# Patient Record
Sex: FEMALE | Race: WHITE | NOT HISPANIC OR LATINO | Employment: OTHER | URBAN - METROPOLITAN AREA
[De-identification: names, ages, dates, MRNs, and addresses within clinical notes are randomized per-mention and may not be internally consistent; named-entity substitution may affect disease eponyms.]

---

## 2017-07-11 RX ORDER — LOSARTAN POTASSIUM 50 MG/1
50 TABLET ORAL 2 TIMES DAILY
COMMUNITY
End: 2017-09-28

## 2017-07-11 RX ORDER — OXYBUTYNIN CHLORIDE 5 MG/1
5 TABLET, EXTENDED RELEASE ORAL DAILY
COMMUNITY
End: 2017-09-28

## 2017-07-11 RX ORDER — GABAPENTIN 100 MG/1
100 CAPSULE ORAL 3 TIMES DAILY
COMMUNITY

## 2017-07-11 RX ORDER — LANOLIN ALCOHOL/MO/W.PET/CERES
3 CREAM (GRAM) TOPICAL
COMMUNITY
End: 2017-09-28

## 2017-07-11 RX ORDER — BISACODYL 10 MG
10 SUPPOSITORY, RECTAL RECTAL DAILY PRN
COMMUNITY

## 2017-07-11 RX ORDER — CHOLECALCIFEROL (VITAMIN D3) 125 MCG
2000 CAPSULE ORAL DAILY
COMMUNITY

## 2017-07-11 RX ORDER — ASCORBIC ACID 250 MG
250 TABLET ORAL DAILY
COMMUNITY
End: 2017-09-28

## 2017-07-11 RX ORDER — CLOPIDOGREL BISULFATE 75 MG/1
75 TABLET ORAL DAILY
COMMUNITY
End: 2017-09-28

## 2017-07-11 RX ORDER — AMLODIPINE BESYLATE 5 MG/1
5 TABLET ORAL DAILY
COMMUNITY
End: 2017-09-28

## 2017-07-11 RX ORDER — SERTRALINE HYDROCHLORIDE 100 MG/1
100 TABLET, FILM COATED ORAL DAILY
COMMUNITY

## 2017-07-11 RX ORDER — ECHINACEA PURPUREA EXTRACT 125 MG
1 TABLET ORAL AS NEEDED
COMMUNITY

## 2017-07-11 RX ORDER — MULTIVITAMIN
1 TABLET ORAL DAILY
COMMUNITY

## 2017-07-11 RX ORDER — ACETAMINOPHEN 325 MG/1
650 TABLET ORAL EVERY 4 HOURS PRN
COMMUNITY

## 2017-07-11 RX ORDER — LEVOTHYROXINE SODIUM 0.03 MG/1
25 TABLET ORAL DAILY
COMMUNITY

## 2017-07-11 RX ORDER — ALBUTEROL SULFATE 2.5 MG/3ML
2.5 SOLUTION RESPIRATORY (INHALATION) EVERY 4 HOURS PRN
COMMUNITY
End: 2017-09-28

## 2017-07-11 RX ORDER — AMOXICILLIN 250 MG
1 CAPSULE ORAL DAILY
COMMUNITY
End: 2017-09-28

## 2017-07-11 RX ORDER — FEXOFENADINE HYDROCHLORIDE 60 MG/1
60 TABLET, FILM COATED ORAL 2 TIMES DAILY
COMMUNITY

## 2017-07-13 ENCOUNTER — ANESTHESIA EVENT (OUTPATIENT)
Dept: PERIOP | Facility: HOSPITAL | Age: 77
End: 2017-07-13
Payer: COMMERCIAL

## 2017-07-14 ENCOUNTER — ANESTHESIA (OUTPATIENT)
Dept: PERIOP | Facility: HOSPITAL | Age: 77
End: 2017-07-14
Payer: COMMERCIAL

## 2017-07-14 ENCOUNTER — HOSPITAL ENCOUNTER (OUTPATIENT)
Facility: HOSPITAL | Age: 77
Setting detail: OBSERVATION
Discharge: NURSING, REHAB OR LONG-TERM CARE | End: 2017-07-15
Attending: UROLOGY | Admitting: UROLOGY
Payer: COMMERCIAL

## 2017-07-14 ENCOUNTER — HOSPITAL ENCOUNTER (OUTPATIENT)
Dept: RADIOLOGY | Facility: HOSPITAL | Age: 77
Setting detail: OUTPATIENT SURGERY
Discharge: HOME | End: 2017-07-14
Payer: COMMERCIAL

## 2017-07-14 DIAGNOSIS — N20.0 CALCULUS OF KIDNEY: ICD-10-CM

## 2017-07-14 DIAGNOSIS — N20.0 URIC ACID NEPHROLITHIASIS: ICD-10-CM

## 2017-07-14 PROCEDURE — 74450 X-RAY URETHRA/BLADDER: CPT

## 2017-07-14 PROCEDURE — C1758 CATHETER, URETERAL: HCPCS | Performed by: UROLOGY

## 2017-07-14 PROCEDURE — C2625 STENT, NON-COR, TEM W/DEL SY: HCPCS | Performed by: UROLOGY

## 2017-07-14 PROCEDURE — 87077 CULTURE AEROBIC IDENTIFY: CPT | Performed by: UROLOGY

## 2017-07-14 PROCEDURE — C1769 GUIDE WIRE: HCPCS | Performed by: UROLOGY

## 2017-07-14 PROCEDURE — 87186 SC STD MICRODIL/AGAR DIL: CPT | Performed by: UROLOGY

## 2017-07-14 PROCEDURE — 87086 URINE CULTURE/COLONY COUNT: CPT | Performed by: UROLOGY

## 2017-07-14 DEVICE — STENT KWART RETRO INJECT SET 7FR 145CM: Type: IMPLANTABLE DEVICE | Site: URETER | Status: FUNCTIONAL

## 2017-07-14 RX ORDER — GABAPENTIN 100 MG/1
100 CAPSULE ORAL 3 TIMES DAILY
Status: DISCONTINUED | OUTPATIENT
Start: 2017-07-14 | End: 2017-07-15 | Stop reason: HOSPADM

## 2017-07-14 RX ORDER — LOSARTAN POTASSIUM 50 MG/1
50 TABLET ORAL 2 TIMES DAILY
Status: DISCONTINUED | OUTPATIENT
Start: 2017-07-15 | End: 2017-07-15 | Stop reason: HOSPADM

## 2017-07-14 RX ORDER — MULTIVIT WITH MINERALS/LUTEIN
250 TABLET ORAL DAILY
Status: DISCONTINUED | OUTPATIENT
Start: 2017-07-15 | End: 2017-07-15 | Stop reason: HOSPADM

## 2017-07-14 RX ORDER — PROPOFOL 10 MG/ML
INJECTION, EMULSION INTRAVENOUS CONTINUOUS PRN
Status: DISCONTINUED | OUTPATIENT
Start: 2017-07-14 | End: 2017-07-14 | Stop reason: SURG

## 2017-07-14 RX ORDER — LEVOTHYROXINE SODIUM 0.03 MG/1
25 TABLET ORAL
Status: DISCONTINUED | OUTPATIENT
Start: 2017-07-15 | End: 2017-07-15 | Stop reason: HOSPADM

## 2017-07-14 RX ORDER — BISACODYL 10 MG
10 SUPPOSITORY, RECTAL RECTAL DAILY PRN
Status: DISCONTINUED | OUTPATIENT
Start: 2017-07-14 | End: 2017-07-15 | Stop reason: HOSPADM

## 2017-07-14 RX ORDER — LANOLIN ALCOHOL/MO/W.PET/CERES
3 CREAM (GRAM) TOPICAL
Status: DISCONTINUED | OUTPATIENT
Start: 2017-07-14 | End: 2017-07-15 | Stop reason: HOSPADM

## 2017-07-14 RX ORDER — FENTANYL CITRATE/PF 50 MCG/ML
50 SYRINGE (ML) INJECTION
Status: DISCONTINUED | OUTPATIENT
Start: 2017-07-14 | End: 2017-07-14 | Stop reason: HOSPADM

## 2017-07-14 RX ORDER — MAGNESIUM HYDROXIDE 1200 MG/15ML
LIQUID ORAL AS NEEDED
Status: DISCONTINUED | OUTPATIENT
Start: 2017-07-14 | End: 2017-07-14 | Stop reason: HOSPADM

## 2017-07-14 RX ORDER — SODIUM CHLORIDE, SODIUM LACTATE, POTASSIUM CHLORIDE, CALCIUM CHLORIDE 600; 310; 30; 20 MG/100ML; MG/100ML; MG/100ML; MG/100ML
125 INJECTION, SOLUTION INTRAVENOUS CONTINUOUS
Status: DISCONTINUED | OUTPATIENT
Start: 2017-07-14 | End: 2017-07-15 | Stop reason: HOSPADM

## 2017-07-14 RX ORDER — ALBUTEROL SULFATE 2.5 MG/3ML
2.5 SOLUTION RESPIRATORY (INHALATION) ONCE AS NEEDED
Status: DISCONTINUED | OUTPATIENT
Start: 2017-07-14 | End: 2017-07-14 | Stop reason: HOSPADM

## 2017-07-14 RX ORDER — SODIUM CHLORIDE 9 MG/ML
125 INJECTION, SOLUTION INTRAVENOUS CONTINUOUS
Status: DISCONTINUED | OUTPATIENT
Start: 2017-07-14 | End: 2017-07-15 | Stop reason: HOSPADM

## 2017-07-14 RX ORDER — PROPOFOL 10 MG/ML
INJECTION, EMULSION INTRAVENOUS AS NEEDED
Status: DISCONTINUED | OUTPATIENT
Start: 2017-07-14 | End: 2017-07-14 | Stop reason: SURG

## 2017-07-14 RX ORDER — ONDANSETRON 2 MG/ML
INJECTION INTRAMUSCULAR; INTRAVENOUS AS NEEDED
Status: DISCONTINUED | OUTPATIENT
Start: 2017-07-14 | End: 2017-07-14 | Stop reason: SURG

## 2017-07-14 RX ORDER — EPHEDRINE SULFATE 50 MG/ML
INJECTION, SOLUTION INTRAVENOUS AS NEEDED
Status: DISCONTINUED | OUTPATIENT
Start: 2017-07-14 | End: 2017-07-14

## 2017-07-14 RX ORDER — SERTRALINE HYDROCHLORIDE 100 MG/1
100 TABLET, FILM COATED ORAL DAILY
Status: DISCONTINUED | OUTPATIENT
Start: 2017-07-15 | End: 2017-07-15 | Stop reason: HOSPADM

## 2017-07-14 RX ORDER — OXYBUTYNIN CHLORIDE 5 MG/1
5 TABLET, EXTENDED RELEASE ORAL DAILY
Status: DISCONTINUED | OUTPATIENT
Start: 2017-07-15 | End: 2017-07-15 | Stop reason: HOSPADM

## 2017-07-14 RX ORDER — AMOXICILLIN 250 MG
1 CAPSULE ORAL DAILY
Status: DISCONTINUED | OUTPATIENT
Start: 2017-07-15 | End: 2017-07-15 | Stop reason: HOSPADM

## 2017-07-14 RX ORDER — MEPERIDINE HYDROCHLORIDE 50 MG/ML
12.5 INJECTION INTRAMUSCULAR; INTRAVENOUS; SUBCUTANEOUS ONCE
Status: DISCONTINUED | OUTPATIENT
Start: 2017-07-14 | End: 2017-07-14 | Stop reason: HOSPADM

## 2017-07-14 RX ORDER — ONDANSETRON 2 MG/ML
4 INJECTION INTRAMUSCULAR; INTRAVENOUS ONCE AS NEEDED
Status: DISCONTINUED | OUTPATIENT
Start: 2017-07-14 | End: 2017-07-14 | Stop reason: HOSPADM

## 2017-07-14 RX ORDER — MELATONIN
2000 DAILY
Status: DISCONTINUED | OUTPATIENT
Start: 2017-07-15 | End: 2017-07-15 | Stop reason: HOSPADM

## 2017-07-14 RX ORDER — FENTANYL CITRATE 50 UG/ML
INJECTION, SOLUTION INTRAMUSCULAR; INTRAVENOUS AS NEEDED
Status: DISCONTINUED | OUTPATIENT
Start: 2017-07-14 | End: 2017-07-14 | Stop reason: SURG

## 2017-07-14 RX ORDER — ACETAMINOPHEN 325 MG/1
650 TABLET ORAL EVERY 6 HOURS PRN
Status: DISCONTINUED | OUTPATIENT
Start: 2017-07-14 | End: 2017-07-15 | Stop reason: HOSPADM

## 2017-07-14 RX ORDER — AMLODIPINE BESYLATE 5 MG/1
5 TABLET ORAL DAILY
Status: DISCONTINUED | OUTPATIENT
Start: 2017-07-15 | End: 2017-07-15 | Stop reason: HOSPADM

## 2017-07-14 RX ORDER — OLANZAPINE 5 MG/1
5 TABLET ORAL 2 TIMES DAILY
Status: DISCONTINUED | OUTPATIENT
Start: 2017-07-15 | End: 2017-07-15 | Stop reason: HOSPADM

## 2017-07-14 RX ADMIN — SODIUM CHLORIDE, SODIUM LACTATE, POTASSIUM CHLORIDE, AND CALCIUM CHLORIDE 125 ML/HR: .6; .31; .03; .02 INJECTION, SOLUTION INTRAVENOUS at 20:04

## 2017-07-14 RX ADMIN — FENTANYL CITRATE 50 MCG: 50 INJECTION, SOLUTION INTRAMUSCULAR; INTRAVENOUS at 14:33

## 2017-07-14 RX ADMIN — SODIUM CHLORIDE 125 ML/HR: 0.9 INJECTION, SOLUTION INTRAVENOUS at 12:14

## 2017-07-14 RX ADMIN — ONDANSETRON HYDROCHLORIDE 4 MG: 2 INJECTION, SOLUTION INTRAVENOUS at 15:01

## 2017-07-14 RX ADMIN — MELATONIN TAB 3 MG 3 MG: 3 TAB at 21:21

## 2017-07-14 RX ADMIN — FENTANYL CITRATE 12.5 MCG: 50 INJECTION, SOLUTION INTRAMUSCULAR; INTRAVENOUS at 16:00

## 2017-07-14 RX ADMIN — FENTANYL CITRATE 12.5 MCG: 50 INJECTION, SOLUTION INTRAMUSCULAR; INTRAVENOUS at 16:11

## 2017-07-14 RX ADMIN — PROPOFOL 50 MCG/KG/MIN: 10 INJECTION, EMULSION INTRAVENOUS at 14:40

## 2017-07-14 RX ADMIN — FENTANYL CITRATE 50 MCG: 50 INJECTION INTRAMUSCULAR; INTRAVENOUS at 18:28

## 2017-07-14 RX ADMIN — PROPOFOL 30 MG: 10 INJECTION, EMULSION INTRAVENOUS at 14:40

## 2017-07-14 RX ADMIN — CEFEPIME HYDROCHLORIDE 2000 MG: 2 INJECTION, POWDER, FOR SOLUTION INTRAVENOUS at 14:38

## 2017-07-14 RX ADMIN — Medication 1 APPLICATION: at 21:30

## 2017-07-15 VITALS
SYSTOLIC BLOOD PRESSURE: 162 MMHG | DIASTOLIC BLOOD PRESSURE: 72 MMHG | TEMPERATURE: 99.7 F | WEIGHT: 260 LBS | OXYGEN SATURATION: 93 % | HEIGHT: 64 IN | BODY MASS INDEX: 44.39 KG/M2 | HEART RATE: 75 BPM | RESPIRATION RATE: 18 BRPM

## 2017-07-15 PROBLEM — N20.0 NEPHROLITHIASIS: Status: ACTIVE | Noted: 2017-07-15

## 2017-07-15 LAB
BASOPHILS # BLD AUTO: 0.01 THOUSANDS/ΜL (ref 0–0.1)
BASOPHILS NFR BLD AUTO: 0 % (ref 0–1)
EOSINOPHIL # BLD AUTO: 0.01 THOUSAND/ΜL (ref 0–0.61)
EOSINOPHIL NFR BLD AUTO: 0 % (ref 0–6)
ERYTHROCYTE [DISTWIDTH] IN BLOOD BY AUTOMATED COUNT: 15.6 % (ref 11.6–15.1)
HCT VFR BLD AUTO: 41.7 % (ref 34.8–46.1)
HGB BLD-MCNC: 14.3 G/DL (ref 11.5–15.4)
LYMPHOCYTES # BLD AUTO: 1.2 THOUSANDS/ΜL (ref 0.6–4.47)
LYMPHOCYTES NFR BLD AUTO: 10 % (ref 14–44)
MCH RBC QN AUTO: 32.1 PG (ref 26.8–34.3)
MCHC RBC AUTO-ENTMCNC: 34.3 G/DL (ref 31.4–37.4)
MCV RBC AUTO: 94 FL (ref 82–98)
MONOCYTES # BLD AUTO: 0.93 THOUSAND/ΜL (ref 0.17–1.22)
MONOCYTES NFR BLD AUTO: 7 % (ref 4–12)
NEUTROPHILS # BLD AUTO: 10.41 THOUSANDS/ΜL (ref 1.85–7.62)
NEUTS SEG NFR BLD AUTO: 83 % (ref 43–75)
NRBC BLD AUTO-RTO: 0 /100 WBCS
PLATELET # BLD AUTO: 157 THOUSANDS/UL (ref 149–390)
PMV BLD AUTO: 9.8 FL (ref 8.9–12.7)
RBC # BLD AUTO: 4.46 MILLION/UL (ref 3.81–5.12)
WBC # BLD AUTO: 12.56 THOUSAND/UL (ref 4.31–10.16)

## 2017-07-15 PROCEDURE — 85025 COMPLETE CBC W/AUTO DIFF WBC: CPT | Performed by: UROLOGY

## 2017-07-15 RX ORDER — OXYBUTYNIN CHLORIDE 5 MG/1
5 TABLET ORAL EVERY 6 HOURS PRN
Status: DISCONTINUED | OUTPATIENT
Start: 2017-07-15 | End: 2017-07-15 | Stop reason: HOSPADM

## 2017-07-15 RX ORDER — PHENAZOPYRIDINE HYDROCHLORIDE 200 MG/1
200 TABLET, FILM COATED ORAL 3 TIMES DAILY PRN
Qty: 10 TABLET | Refills: 0 | Status: SHIPPED | OUTPATIENT
Start: 2017-07-15 | End: 2017-07-18

## 2017-07-15 RX ORDER — HYDROCODONE BITARTRATE AND ACETAMINOPHEN 5; 325 MG/1; MG/1
1 TABLET ORAL EVERY 6 HOURS PRN
Qty: 5 TABLET | Refills: 0 | Status: SHIPPED | OUTPATIENT
Start: 2017-07-15 | End: 2017-07-25

## 2017-07-15 RX ORDER — LEVOFLOXACIN 500 MG/1
500 TABLET, FILM COATED ORAL EVERY 24 HOURS
Qty: 5 TABLET | Refills: 0 | Status: SHIPPED | OUTPATIENT
Start: 2017-07-15 | End: 2017-07-20

## 2017-07-15 RX ADMIN — GABAPENTIN 100 MG: 100 CAPSULE ORAL at 17:18

## 2017-07-15 RX ADMIN — Medication 1 APPLICATION: at 09:30

## 2017-07-15 RX ADMIN — SODIUM CHLORIDE, SODIUM LACTATE, POTASSIUM CHLORIDE, AND CALCIUM CHLORIDE 125 ML/HR: .6; .31; .03; .02 INJECTION, SOLUTION INTRAVENOUS at 03:40

## 2017-07-15 RX ADMIN — LEVOTHYROXINE SODIUM 25 MCG: 25 TABLET ORAL at 06:27

## 2017-07-15 RX ADMIN — AMLODIPINE BESYLATE 5 MG: 5 TABLET ORAL at 09:30

## 2017-07-15 RX ADMIN — Medication 1 TABLET: at 09:30

## 2017-07-15 RX ADMIN — VITAMIN D, TAB 1000IU (100/BT) 2000 UNITS: 25 TAB at 09:30

## 2017-07-15 RX ADMIN — OXYBUTYNIN CHLORIDE 5 MG: 5 TABLET ORAL at 01:00

## 2017-07-15 RX ADMIN — GABAPENTIN 100 MG: 100 CAPSULE ORAL at 09:30

## 2017-07-15 RX ADMIN — ASCORBIC ACID TAB 250 MG 250 MG: 250 TAB at 09:29

## 2017-07-15 RX ADMIN — METOPROLOL TARTRATE 12.5 MG: 25 TABLET ORAL at 09:30

## 2017-07-15 RX ADMIN — OLANZAPINE 5 MG: 5 TABLET, FILM COATED ORAL at 09:37

## 2017-07-15 RX ADMIN — CEFEPIME HYDROCHLORIDE 1000 MG: 1 INJECTION, POWDER, FOR SOLUTION INTRAMUSCULAR; INTRAVENOUS at 17:40

## 2017-07-15 RX ADMIN — LOSARTAN POTASSIUM 50 MG: 50 TABLET, FILM COATED ORAL at 17:18

## 2017-07-15 RX ADMIN — OXYBUTYNIN CHLORIDE 5 MG: 5 TABLET, EXTENDED RELEASE ORAL at 09:30

## 2017-07-15 RX ADMIN — ACETAMINOPHEN 650 MG: 325 TABLET, FILM COATED ORAL at 17:18

## 2017-07-15 RX ADMIN — METOPROLOL TARTRATE 12.5 MG: 25 TABLET ORAL at 17:18

## 2017-07-15 RX ADMIN — OXYBUTYNIN CHLORIDE 5 MG: 5 TABLET ORAL at 17:18

## 2017-07-15 RX ADMIN — CEFEPIME HYDROCHLORIDE 1000 MG: 1 INJECTION, POWDER, FOR SOLUTION INTRAMUSCULAR; INTRAVENOUS at 01:31

## 2017-07-15 RX ADMIN — ACETAMINOPHEN 650 MG: 325 TABLET, FILM COATED ORAL at 01:00

## 2017-07-15 RX ADMIN — SERTRALINE HYDROCHLORIDE 100 MG: 100 TABLET, FILM COATED ORAL at 09:30

## 2017-07-15 RX ADMIN — LOSARTAN POTASSIUM 50 MG: 50 TABLET, FILM COATED ORAL at 09:30

## 2017-07-17 LAB
BACTERIA UR CULT: ABNORMAL
BACTERIA UR CULT: ABNORMAL

## 2017-07-19 ENCOUNTER — GENERIC CONVERSION - ENCOUNTER (OUTPATIENT)
Dept: OTHER | Facility: OTHER | Age: 77
End: 2017-07-19

## 2017-07-25 ENCOUNTER — GENERIC CONVERSION - ENCOUNTER (OUTPATIENT)
Dept: OTHER | Facility: OTHER | Age: 77
End: 2017-07-25

## 2017-08-01 ENCOUNTER — GENERIC CONVERSION - ENCOUNTER (OUTPATIENT)
Dept: OTHER | Facility: OTHER | Age: 77
End: 2017-08-01

## 2017-08-14 ENCOUNTER — GENERIC CONVERSION - ENCOUNTER (OUTPATIENT)
Dept: OTHER | Facility: OTHER | Age: 77
End: 2017-08-14

## 2017-08-28 ENCOUNTER — GENERIC CONVERSION - ENCOUNTER (OUTPATIENT)
Dept: OTHER | Facility: OTHER | Age: 77
End: 2017-08-28

## 2017-09-13 ENCOUNTER — GENERIC CONVERSION - ENCOUNTER (OUTPATIENT)
Dept: OTHER | Facility: OTHER | Age: 77
End: 2017-09-13

## 2017-09-15 ENCOUNTER — APPOINTMENT (OUTPATIENT)
Dept: LAB | Facility: HOSPITAL | Age: 77
End: 2017-09-15
Attending: UROLOGY
Payer: COMMERCIAL

## 2017-09-15 DIAGNOSIS — Z87.440 PERSONAL HISTORY OF URINARY INFECTION: ICD-10-CM

## 2017-09-15 PROCEDURE — 87086 URINE CULTURE/COLONY COUNT: CPT

## 2017-09-15 PROCEDURE — 87186 SC STD MICRODIL/AGAR DIL: CPT

## 2017-09-15 PROCEDURE — 87077 CULTURE AEROBIC IDENTIFY: CPT

## 2017-09-18 LAB
BACTERIA UR CULT: ABNORMAL

## 2017-09-20 ENCOUNTER — GENERIC CONVERSION - ENCOUNTER (OUTPATIENT)
Dept: OTHER | Facility: OTHER | Age: 77
End: 2017-09-20

## 2017-09-22 ENCOUNTER — GENERIC CONVERSION - ENCOUNTER (OUTPATIENT)
Dept: OTHER | Facility: OTHER | Age: 77
End: 2017-09-22

## 2017-09-26 ENCOUNTER — GENERIC CONVERSION - ENCOUNTER (OUTPATIENT)
Dept: OTHER | Facility: OTHER | Age: 77
End: 2017-09-26

## 2017-09-28 RX ORDER — ASPIRIN 81 MG/1
81 TABLET ORAL DAILY
COMMUNITY

## 2017-09-28 RX ORDER — SIMETHICONE 80 MG
80 TABLET,CHEWABLE ORAL EVERY 6 HOURS PRN
COMMUNITY

## 2017-09-28 RX ORDER — ONDANSETRON 4 MG/1
4 TABLET, FILM COATED ORAL EVERY 8 HOURS PRN
COMMUNITY
End: 2017-11-22 | Stop reason: ALTCHOICE

## 2017-09-28 RX ORDER — DOXYCYCLINE 100 MG/1
100 CAPSULE ORAL 2 TIMES DAILY
Status: ON HOLD | COMMUNITY
Start: 2017-09-30 | End: 2017-10-04

## 2017-09-28 NOTE — PRE-PROCEDURE INSTRUCTIONS
Pre-Surgery Instructions:   Medication Instructions    acetaminophen (TYLENOL) 325 mg tablet Instructed patient per Anesthesia Guidelines   aspirin (ECOTRIN LOW STRENGTH) 81 mg EC tablet Patient was instructed to contact Physician for medication instruction   bisacodyl (DULCOLAX) 10 mg suppository Instructed patient per Anesthesia Guidelines   Carboxymethylcellul-Glycerin (REFRESH OPTIVE) 0 5-0 9 % SOLN Instructed patient per Anesthesia Guidelines   Cholecalciferol (VITAMIN D3) 2000 units TABS Instructed patient per Anesthesia Guidelines   [START ON 9/30/2017] doxycycline monohydrate (MONODOX) 100 mg capsule Instructed patient per Anesthesia Guidelines   fexofenadine (ALLEGRA) 60 MG tablet Instructed patient per Anesthesia Guidelines   gabapentin (NEURONTIN) 100 mg capsule Instructed patient per Anesthesia Guidelines   levothyroxine 25 mcg tablet Instructed patient per Anesthesia Guidelines   magnesium hydroxide (MILK OF MAGNESIA) 400 mg/5 mL oral suspension Instructed patient per Anesthesia Guidelines   Menthol, Topical Analgesic, (BIOFREEZE) 4 % GEL Instructed patient per Anesthesia Guidelines   metoprolol tartrate (LOPRESSOR) 12 5 mg tablet Instructed patient per Anesthesia Guidelines   miconazole (REMEDY ANTIFUNGAL) 2 % powder Instructed patient per Anesthesia Guidelines   Multiple Vitamin (MULTIVITAMIN) tablet Instructed patient per Anesthesia Guidelines   OLANZAPINE PO Instructed patient per Anesthesia Guidelines   ondansetron (ZOFRAN) 4 mg tablet Instructed patient per Anesthesia Guidelines   rivaroxaban (XARELTO) 20 mg tablet Patient was instructed to contact Physician for medication instruction   efrem Instructed patient per Anesthesia Guidelines   sertraline (ZOLOFT) 100 mg tablet Instructed patient per Anesthesia Guidelines   simethicone (MYLICON) 80 mg chewable tablet Instructed patient per Anesthesia Guidelines      sodium chloride (OCEAN) 0 65 % nasal spray Instructed patient per Anesthesia Guidelines   sodium phosphate (FLEET) enema Instructed patient per Anesthesia Guidelines  Patient lives at the [facility] in [place]  Allergies, Medications, and Medical History reviewed with staff and patient's son  Instructed facility patient is to take metoprolol, zoloft, and levothyroxine am of surgery with sip of water  Instructed staff to please contact physician's office on when to stop aspirin and xarelto  [facility] pre-op instructions and pre-op bathing instructions faxed to [facility] at [place]  Instructed facility patient is to hold all aspirin, supplements, vitamins, and NSAIDs from now and until after surgery

## 2017-09-28 NOTE — H&P
HISTORY AND PHYSICAL  ? ? Patient Name: Erik Hollins  Patient MRN: [id number]  Attending Provider: Deonna Sanchez MD  Service: Urology  Chief Complaint  Left hydronephrosis, chronic renal calculi  HPI   Erik Hollins is a 68 y o  female with long hx stones; left kidney has large staghorn that fills most of the renal pelvis  She has an indwelling stent that is supposed to be changed every 4 - 6 months  July 2017 she had planned stent exchange, but the existing stent was encrusted by the stone and could not be removed  A new stent was placed alongside the old, and we have decided to leave the old stent in place and continue changing the new stent  Any more complicated procedures to remove the staghorn stone and the encrusted stent are too risky for her with her chronic problems  I plan cysto, exchange of the functioning stent of the left renal pelvis  Potential risks and complications discussed, and informed consent was given by the patient  Medications  Meds/Allergies     No current facility-administered medications for this encounter  Cannot display prior to admission medications because the patient has not been admitted in this contact  No current facility-administered medications for this encounter       Current Outpatient Prescriptions:     acetaminophen (TYLENOL) 325 mg tablet, Take 650 mg by mouth every 4 (four) hours as needed for mild pain  , Disp: , Rfl:     aspirin (ECOTRIN LOW STRENGTH) 81 mg EC tablet, Take 81 mg by mouth daily, Disp: , Rfl:     bisacodyl (DULCOLAX) 10 mg suppository, Insert 10 mg into the rectum daily as needed for constipation, Disp: , Rfl:     Cholecalciferol (VITAMIN D3) 2000 units TABS, Take 2,000 Units by mouth daily, Disp: , Rfl:     [START ON 9/30/2017] doxycycline monohydrate (MONODOX) 100 mg capsule, Take 100 mg by mouth 2 (two) times a day Take two times a day for three days starting on September 30th, Disp: , Rfl:     gabapentin (NEURONTIN) 100 mg capsule, Take 100 mg by mouth 3 (three) times a day, Disp: , Rfl:     levothyroxine 25 mcg tablet, Take 25 mcg by mouth daily, Disp: , Rfl:     miconazole (REMEDY ANTIFUNGAL) 2 % powder, Apply 1 application topically 3 (three) times a day, Disp: , Rfl:     schraggers, Apply 1 application topically 2 (two) times a day Apply to right and left buttocks for excoriation   , Disp: , Rfl:     sodium phosphate (FLEET) enema, Insert 1 enema into the rectum as needed follow package directions, Disp: , Rfl:     albuterol (2 5 mg/3 mL) 0 083 % nebulizer solution, Take 2 5 mg by nebulization every 4 (four) hours as needed for wheezing, Disp: , Rfl:     amLODIPine (NORVASC) 5 mg tablet, Take 5 mg by mouth daily, Disp: , Rfl:     ascorbic acid (VITAMIN C) 250 MG tablet, Take 250 mg by mouth daily, Disp: , Rfl:     Carboxymethylcellul-Glycerin (REFRESH OPTIVE) 0 5-0 9 % SOLN, Apply 1 drop to eye 2 (two) times a day, Disp: , Rfl:     clopidogrel (PLAVIX) 75 mg tablet, Take 75 mg by mouth daily Not required to stop per md order , Disp: , Rfl:     fexofenadine (ALLEGRA) 60 MG tablet, Take 60 mg by mouth daily, Disp: , Rfl:     losartan (COZAAR) 50 mg tablet, Take 50 mg by mouth 2 (two) times a day, Disp: , Rfl:     magnesium hydroxide (MILK OF MAGNESIA) 400 mg/5 mL oral suspension, Take 30 mL by mouth daily as needed for constipation, Disp: , Rfl:     melatonin 3 mg, Take 3 mg by mouth daily at bedtime, Disp: , Rfl:     Menthol, Topical Analgesic, (BIOFREEZE) 4 % GEL, Apply topically every 4 (four) hours as needed For shoulder pain, Disp: , Rfl:     metoprolol tartrate (LOPRESSOR) 12 5 mg tablet, Take 12 5 mg by mouth 2 (two) times a day, Disp: , Rfl:     Multiple Vitamin (MULTIVITAMIN) tablet, Take 1 tablet by mouth daily, Disp: , Rfl:     OLANZapine (ZyPREXA) 5 mg tablet, Take 5 mg by mouth 2 (two) times a day, Disp: , Rfl:     oxybutynin (DITROPAN-XL) 5 mg 24 hr tablet, Take 5 mg by mouth daily, Disp: , Rfl:    senna-docusate sodium (SENOKOT S) 8 6-50 mg per tablet, Take 1 tablet by mouth daily, Disp: , Rfl:     sertraline (ZOLOFT) 100 mg tablet, Take 100 mg by mouth daily, Disp: , Rfl:     sodium chloride (OCEAN) 0 65 % nasal spray, 1 spray into each nostril daily, Disp: , Rfl:   Review of Systems  10 point review of systems negative except as noted in HPI  Allergies  Allergies   Allergen Reactions    Clindamycin Hives and Other (See Comments)     Hives in throat      Alendronate Other (See Comments)     unknown    Butorphanol Other (See Comments)     unknown    Cephalosporins Other (See Comments)     unknown    Epinephrine Other (See Comments)     unknown    Erythromycin Other (See Comments)     unknown    Matias Flavor Other (See Comments)     unknown    Omeprazole Other (See Comments)     unknown    Papaya Derivatives Other (See Comments)     unknown    Vancomycin Other (See Comments)     unknown     PMH  Past Medical History:   Diagnosis Date    Abnormality of gait and mobility     Anxiety     Calculus of kidney     Constipation     Dementia     Depression     Dry eye syndrome     Food allergy     matias/papaya    GERD (gastroesophageal reflux disease)     Hand injury     site of mrsa infection    Hemiplegia and hemiparesis     left    Hypertension     Hypothyroid     Nephrolithiasis     Paroxysmal atrial fibrillation     Polyneuropathy     Seasonal allergies     Stroke     left sided weakness    Ureteral stent retained     Wears dentures     full set    Wears glasses     Wheelchair bound      Past surgical history  Past Surgical History:   Procedure Laterality Date    CARPAL TUNNEL RELEASE Right     CYSTOSCOPY      HYSTERECTOMY      VT CYSTOSCOPY,INSERT URETERAL STENT Left 7/14/2017    Procedure: CYSTOSCOPY, laser bladder and left urethal stone, bilateral retrograde pyelogram, left uteroscopy, and left stent placement;  Surgeon: Nenita Casanova MD;  Location: [facility];  Service: Urology    SALPINGOOPHORECTOMY Bilateral      Social history  Social History   Substance Use Topics    Smoking status: Never Smoker    Smokeless tobacco: Never Used    Alcohol use No     ?  Physical Exam  General appearance: alert, appears stated age, cooperative, distracted and moderately obese  Head: Normocephalic, without obvious abnormality, atraumatic  Neck: no adenopathy, no carotid bruit, no JVD, supple, symmetrical, trachea midline and thyroid not enlarged, symmetric, no tenderness/mass/nodules  Back: symmetric, no curvature  ROM normal  No CVA tenderness    Lungs: clear to auscultation bilaterally  Heart: regular rate and rhythm, S1, S2 normal, no murmur, click, rub or gallop  Abdomen: soft, non-tender; bowel sounds normal; no masses,  no organomegaly  Extremities: extremities normal, atraumatic, no cyanosis or edema  Pulses: 2+ and symmetric  Lymph nodes: Cervical, supraclavicular, and axillary nodes normal   Anish Toussaint MD

## 2017-09-29 ENCOUNTER — ANESTHESIA EVENT (OUTPATIENT)
Dept: PERIOP | Facility: HOSPITAL | Age: 77
End: 2017-09-29
Payer: COMMERCIAL

## 2017-10-03 ENCOUNTER — HOSPITAL ENCOUNTER (OUTPATIENT)
Dept: RADIOLOGY | Facility: HOSPITAL | Age: 77
Setting detail: OUTPATIENT SURGERY
Discharge: HOME | End: 2017-10-03
Payer: COMMERCIAL

## 2017-10-03 ENCOUNTER — HOSPITAL ENCOUNTER (OUTPATIENT)
Facility: HOSPITAL | Age: 77
Setting detail: OUTPATIENT SURGERY
Discharge: HOME | End: 2017-10-04
Attending: UROLOGY | Admitting: UROLOGY
Payer: COMMERCIAL

## 2017-10-03 ENCOUNTER — ANESTHESIA (OUTPATIENT)
Dept: PERIOP | Facility: HOSPITAL | Age: 77
End: 2017-10-03
Payer: COMMERCIAL

## 2017-10-03 DIAGNOSIS — Z78.9 MEDICALLY COMPLEX PATIENT: Primary | ICD-10-CM

## 2017-10-03 DIAGNOSIS — N20.0 CALCULUS OF KIDNEY: ICD-10-CM

## 2017-10-03 LAB — GLUCOSE SERPL-MCNC: 81 MG/DL (ref 65–140)

## 2017-10-03 PROCEDURE — 76000 FLUOROSCOPY <1 HR PHYS/QHP: CPT

## 2017-10-03 PROCEDURE — C1726 CATH, BAL DIL, NON-VASCULAR: HCPCS | Performed by: UROLOGY

## 2017-10-03 PROCEDURE — C1769 GUIDE WIRE: HCPCS | Performed by: UROLOGY

## 2017-10-03 PROCEDURE — 82948 REAGENT STRIP/BLOOD GLUCOSE: CPT

## 2017-10-03 PROCEDURE — C2617 STENT, NON-COR, TEM W/O DEL: HCPCS | Performed by: UROLOGY

## 2017-10-03 DEVICE — STENT CONTOUR INJ 7FR 30CM: Type: IMPLANTABLE DEVICE | Site: URETER | Status: FUNCTIONAL

## 2017-10-03 RX ORDER — BISACODYL 10 MG
10 SUPPOSITORY, RECTAL RECTAL DAILY PRN
Status: DISCONTINUED | OUTPATIENT
Start: 2017-10-03 | End: 2017-10-04 | Stop reason: HOSPADM

## 2017-10-03 RX ORDER — OXYBUTYNIN CHLORIDE 5 MG/1
5 TABLET ORAL 2 TIMES DAILY
Status: DISCONTINUED | OUTPATIENT
Start: 2017-10-03 | End: 2017-10-04 | Stop reason: HOSPADM

## 2017-10-03 RX ORDER — LEVOTHYROXINE SODIUM 0.03 MG/1
25 TABLET ORAL DAILY
Status: DISCONTINUED | OUTPATIENT
Start: 2017-10-03 | End: 2017-10-04 | Stop reason: HOSPADM

## 2017-10-03 RX ORDER — GABAPENTIN 100 MG/1
100 CAPSULE ORAL 3 TIMES DAILY
Status: DISCONTINUED | OUTPATIENT
Start: 2017-10-03 | End: 2017-10-04 | Stop reason: HOSPADM

## 2017-10-03 RX ORDER — METOPROLOL TARTRATE 5 MG/5ML
5 INJECTION INTRAVENOUS ONCE
Status: COMPLETED | OUTPATIENT
Start: 2017-10-03 | End: 2017-10-03

## 2017-10-03 RX ORDER — MEPERIDINE HYDROCHLORIDE 50 MG/ML
12.5 INJECTION INTRAMUSCULAR; INTRAVENOUS; SUBCUTANEOUS ONCE AS NEEDED
Status: DISCONTINUED | OUTPATIENT
Start: 2017-10-03 | End: 2017-10-03 | Stop reason: HOSPADM

## 2017-10-03 RX ORDER — ASPIRIN 81 MG/1
81 TABLET ORAL DAILY
Status: DISCONTINUED | OUTPATIENT
Start: 2017-10-03 | End: 2017-10-04 | Stop reason: HOSPADM

## 2017-10-03 RX ORDER — MAGNESIUM HYDROXIDE 1200 MG/15ML
LIQUID ORAL AS NEEDED
Status: DISCONTINUED | OUTPATIENT
Start: 2017-10-03 | End: 2017-10-03 | Stop reason: HOSPADM

## 2017-10-03 RX ORDER — PROPOFOL 10 MG/ML
INJECTION, EMULSION INTRAVENOUS CONTINUOUS PRN
Status: DISCONTINUED | OUTPATIENT
Start: 2017-10-03 | End: 2017-10-03 | Stop reason: SURG

## 2017-10-03 RX ORDER — ONDANSETRON 2 MG/ML
4 INJECTION INTRAMUSCULAR; INTRAVENOUS EVERY 6 HOURS PRN
Status: DISCONTINUED | OUTPATIENT
Start: 2017-10-03 | End: 2017-10-04 | Stop reason: HOSPADM

## 2017-10-03 RX ORDER — GENTAMICIN SULFATE 80 MG/50ML
1.5 INJECTION, SOLUTION INTRAVENOUS ONCE
Status: COMPLETED | OUTPATIENT
Start: 2017-10-03 | End: 2017-10-03

## 2017-10-03 RX ORDER — SIMETHICONE 80 MG
80 TABLET,CHEWABLE ORAL EVERY 6 HOURS PRN
Status: DISCONTINUED | OUTPATIENT
Start: 2017-10-03 | End: 2017-10-04 | Stop reason: HOSPADM

## 2017-10-03 RX ORDER — SODIUM CHLORIDE 9 MG/ML
50 INJECTION, SOLUTION INTRAVENOUS CONTINUOUS
Status: DISCONTINUED | OUTPATIENT
Start: 2017-10-03 | End: 2017-10-04 | Stop reason: HOSPADM

## 2017-10-03 RX ORDER — LABETALOL HYDROCHLORIDE 5 MG/ML
INJECTION, SOLUTION INTRAVENOUS AS NEEDED
Status: DISCONTINUED | OUTPATIENT
Start: 2017-10-03 | End: 2017-10-03 | Stop reason: SURG

## 2017-10-03 RX ORDER — SERTRALINE HYDROCHLORIDE 100 MG/1
100 TABLET, FILM COATED ORAL DAILY
Status: DISCONTINUED | OUTPATIENT
Start: 2017-10-03 | End: 2017-10-04 | Stop reason: HOSPADM

## 2017-10-03 RX ORDER — FENTANYL CITRATE/PF 50 MCG/ML
50 SYRINGE (ML) INJECTION
Status: DISCONTINUED | OUTPATIENT
Start: 2017-10-03 | End: 2017-10-03 | Stop reason: HOSPADM

## 2017-10-03 RX ORDER — PROPOFOL 10 MG/ML
INJECTION, EMULSION INTRAVENOUS AS NEEDED
Status: DISCONTINUED | OUTPATIENT
Start: 2017-10-03 | End: 2017-10-03 | Stop reason: SURG

## 2017-10-03 RX ORDER — SODIUM CHLORIDE 9 MG/ML
125 INJECTION, SOLUTION INTRAVENOUS CONTINUOUS
Status: DISCONTINUED | OUTPATIENT
Start: 2017-10-03 | End: 2017-10-03

## 2017-10-03 RX ORDER — ACETAMINOPHEN 325 MG/1
650 TABLET ORAL EVERY 4 HOURS PRN
Status: DISCONTINUED | OUTPATIENT
Start: 2017-10-03 | End: 2017-10-04 | Stop reason: HOSPADM

## 2017-10-03 RX ORDER — MELATONIN
2000 DAILY
Status: DISCONTINUED | OUTPATIENT
Start: 2017-10-03 | End: 2017-10-04 | Stop reason: HOSPADM

## 2017-10-03 RX ORDER — ONDANSETRON 2 MG/ML
4 INJECTION INTRAMUSCULAR; INTRAVENOUS ONCE AS NEEDED
Status: DISCONTINUED | OUTPATIENT
Start: 2017-10-03 | End: 2017-10-03 | Stop reason: HOSPADM

## 2017-10-03 RX ADMIN — MEROPENEM 1000 MG: 1 INJECTION, POWDER, FOR SOLUTION INTRAVENOUS at 18:49

## 2017-10-03 RX ADMIN — LABETALOL HYDROCHLORIDE 5 MG: 5 INJECTION, SOLUTION INTRAVENOUS at 10:29

## 2017-10-03 RX ADMIN — PROPOFOL 20 MG: 10 INJECTION, EMULSION INTRAVENOUS at 10:34

## 2017-10-03 RX ADMIN — MEROPENEM 1000 MG: 1 INJECTION, POWDER, FOR SOLUTION INTRAVENOUS at 11:19

## 2017-10-03 RX ADMIN — OXYBUTYNIN CHLORIDE 5 MG: 5 TABLET ORAL at 18:52

## 2017-10-03 RX ADMIN — PROPOFOL 75 MCG/KG/MIN: 10 INJECTION, EMULSION INTRAVENOUS at 09:58

## 2017-10-03 RX ADMIN — SODIUM CHLORIDE 125 ML/HR: 0.9 INJECTION, SOLUTION INTRAVENOUS at 08:25

## 2017-10-03 RX ADMIN — METOPROLOL TARTRATE 12.5 MG: 25 TABLET ORAL at 18:52

## 2017-10-03 RX ADMIN — VITAMIN D, TAB 1000IU (100/BT) 2000 UNITS: 25 TAB at 14:29

## 2017-10-03 RX ADMIN — GABAPENTIN 100 MG: 100 CAPSULE ORAL at 21:32

## 2017-10-03 RX ADMIN — ASPIRIN 81 MG: 81 TABLET, COATED ORAL at 14:29

## 2017-10-03 RX ADMIN — SODIUM CHLORIDE 50 ML/HR: 0.9 INJECTION, SOLUTION INTRAVENOUS at 11:20

## 2017-10-03 RX ADMIN — ONDANSETRON 4 MG: 2 INJECTION INTRAMUSCULAR; INTRAVENOUS at 18:55

## 2017-10-03 RX ADMIN — GENTAMICIN SULFATE 80 MG: 80 INJECTION, SOLUTION INTRAVENOUS at 10:11

## 2017-10-03 RX ADMIN — Medication 1 APPLICATION: at 18:52

## 2017-10-03 RX ADMIN — METOPROLOL TARTRATE 5 MG: 5 INJECTION, SOLUTION INTRAVENOUS at 14:20

## 2017-10-03 RX ADMIN — GABAPENTIN 100 MG: 100 CAPSULE ORAL at 17:17

## 2017-10-03 RX ADMIN — LABETALOL HYDROCHLORIDE 5 MG: 5 INJECTION, SOLUTION INTRAVENOUS at 09:55

## 2017-10-03 RX ADMIN — SERTRALINE HYDROCHLORIDE 100 MG: 100 TABLET ORAL at 14:29

## 2017-10-03 NOTE — PROGRESS NOTES
See op note re  Extensive procedure required to change the stent  She received gentamicin preop; and doxycyline for 3 days as outpt  Still at risk for infection due to chronic colonization, so I will keep her overnight for IV Abx  Family aware

## 2017-10-03 NOTE — PLAN OF CARE
DISCHARGE PLANNING     Discharge to home or other facility with appropriate resources Progressing        GENITOURINARY - ADULT     Maintains or returns to baseline urinary function Progressing     Absence of urinary retention Progressing        INFECTION - ADULT     Absence or prevention of progression during hospitalization Progressing     Absence of fever/infection during neutropenic period Progressing        PAIN - ADULT     Verbalizes/displays adequate comfort level or baseline comfort level Progressing        Potential for Falls     Patient will remain free of falls Progressing        Prexisting or High Potential for Compromised Skin Integrity     Skin integrity is maintained or improved Progressing        SAFETY ADULT     Maintain or return to baseline ADL function Progressing     Maintain or return mobility status to optimal level Progressing

## 2017-10-03 NOTE — ANESTHESIA PREPROCEDURE EVALUATION
Review of Systems/Medical History  Patient summary reviewed  Chart reviewed      Cardiovascular  Exercise tolerance: poor,  Hyperlipidemia, Hypertension controlled, Dysrhythmias (Hx PAF), atrial fibrillation,   Comment: Hx of paroxysmal A-Fib,  Pulmonary  Negative pulmonary ROS Shortness of breath, ,        GI/Hepatic    GERD well controlled,   Comment: Hx of Gerd not treated     Kidney stones,   Comment: Ureteral Stents pt does not know which side     Endo/Other  History of thyroid disease , hypothyroidism,   Comment: No DM meds listed   GYN  Negative gynecology ROS          Hematology  Negative hematology ROS Anemia ,     Musculoskeletal  Back pain , spinal stenosis,   Comment: Lumbar DDD      Neurology    Neuromuscular disease , TIA, CVA , residual symptoms, Visual impairment (dry eye)  Comment: Residual stroke effects on left arm Psychology   Anxiety, Depression , being treated for depression, Schizophrenia           Physical Exam    Airway    Mallampati score: II  TM Distance: >3 FB  Neck ROM: full     Dental   Comment: Capped teeth,     Cardiovascular  Rhythm: regular, Rate: normal, Cardiovascular exam normal    Pulmonary  Pulmonary exam normal Breath sounds clear to auscultation,     Other Findings        Anesthesia Plan  ASA Score- 3       Anesthesia Type- IV sedation with anesthesia        Induction- intravenous      Informed Consent  Anesthetic plan and risks discussed with patient

## 2017-10-03 NOTE — OP NOTE
OPERATIVE REPORT  PATIENT NAME: Chinedu Guidry    :  1940  MRN: [id number]  Pt Location: [facility] 06    SURGERY DATE: 10/3/2017    Surgeon(s) and Role:     * Rodney Gong MD - Primary    Preop Diagnosis:  Calculus of kidney [N20 0]    Post-Op Diagnosis Codes:     * Calculus of kidney [N20 0]    Procedure(s) (LRB):  CYSTOSCOPY, EXCHANGE STENT URETERAL (Left)  URETEROSCOPY (Left) and retrograde pyelogram  Specimen(s):  * No specimens in log *    Estimated Blood Loss:   Minimal    Drains:  Ureteral Drain/Stent Left ureter 6 Fr  (Active)   Site Assessment DEEPTHI 10/3/2017 10:50 AM   Number of days: 0       Anesthesia Type:   IV Sedation with Anesthesia    Operative Indications:  Calculus of kidney [N20 0]  Chronic hydro;  Encrusted "older"  stent still in place from several months ago that could not be removed;  "newer"  stent is 4 mo old now    Operative Findings:  Newer stent was mildly encrusted, requiring ureteroscopy to free it up  New 7 F stent placed, the older stent that is encrusted still remains  Complications:   None    Procedure and Technique:      After the patient was placed under adequate anesthesia, she was prepped and draped using betadine solution in lithotomy position  The 25 Thai scope was passed thru the urethra, which showed no strictures  The bladder had mild chronic inflammation from the prior stent, but no lesions or tumors  There were 2 stents visible in the bladder as expected; the newer one from 2017 stent exchange was withdrawn and attempted to be wired  The wire would not go all the way thru  I gently withdrew the stent further attempting to remove it but it would not budge, so I did not force it  I placed a rigid ureteroscope up alongside the two stents;  I could see encrustation to a minimal degree on the newer stent;   As I passed the scope up into renal pelvis, the placement of the scope and irrigation fluid freed up the newer stent;  I was able to withdraw it at this point without trauma to the renal pelvis or ureter  There was too much inflammation, sediment to see the older stent and the stone encrusting it  I placed a wire thru the ureteroscope as I removed the scope; With the cystoscope a new 7 F stent was passed over the wire to the renal pelvis  Retrograde pyelogram showed good positioning and mild hydro  The scope was removed, leaving the stent in place  She was taken to [facility] in good condition       I was present for the entire procedure    Patient Disposition:  PACU     SIGNATURE: David Rodriguez MD  DATE: October 3, 2017  TIME: 11:45 AM

## 2017-10-04 VITALS
SYSTOLIC BLOOD PRESSURE: 116 MMHG | HEIGHT: 62 IN | RESPIRATION RATE: 18 BRPM | HEART RATE: 75 BPM | DIASTOLIC BLOOD PRESSURE: 57 MMHG | WEIGHT: 268 LBS | BODY MASS INDEX: 49.32 KG/M2 | OXYGEN SATURATION: 92 % | TEMPERATURE: 98.6 F

## 2017-10-04 LAB
ANISOCYTOSIS BLD QL SMEAR: PRESENT
BASOPHILS # BLD MANUAL: 0 THOUSAND/UL (ref 0–0.1)
BASOPHILS NFR MAR MANUAL: 0 % (ref 0–1)
EOSINOPHIL # BLD MANUAL: 0 THOUSAND/UL (ref 0–0.4)
EOSINOPHIL NFR BLD MANUAL: 0 % (ref 0–6)
ERYTHROCYTE [DISTWIDTH] IN BLOOD BY AUTOMATED COUNT: 15.5 % (ref 11.6–15.1)
HCT VFR BLD AUTO: 42.1 % (ref 34.8–46.1)
HGB BLD-MCNC: 13.5 G/DL (ref 11.5–15.4)
LYMPHOCYTES # BLD AUTO: 0.98 THOUSAND/UL (ref 0.6–4.47)
LYMPHOCYTES # BLD AUTO: 11 % (ref 14–44)
MCH RBC QN AUTO: 29.2 PG (ref 26.8–34.3)
MCHC RBC AUTO-ENTMCNC: 32.1 G/DL (ref 31.4–37.4)
MCV RBC AUTO: 91 FL (ref 82–98)
MONOCYTES # BLD AUTO: 0.18 THOUSAND/UL (ref 0–1.22)
MONOCYTES NFR BLD: 2 % (ref 4–12)
NEUTROPHILS # BLD MANUAL: 7.74 THOUSAND/UL (ref 1.85–7.62)
NEUTS SEG NFR BLD AUTO: 87 % (ref 43–75)
NRBC BLD AUTO-RTO: 1 /100 WBCS
PLATELET # BLD AUTO: 215 THOUSANDS/UL (ref 149–390)
PLATELET BLD QL SMEAR: ADEQUATE
PMV BLD AUTO: 10 FL (ref 8.9–12.7)
RBC # BLD AUTO: 4.63 MILLION/UL (ref 3.81–5.12)
TOTAL CELLS COUNTED SPEC: 100
WBC # BLD AUTO: 8.9 THOUSAND/UL (ref 4.31–10.16)

## 2017-10-04 PROCEDURE — 85027 COMPLETE CBC AUTOMATED: CPT | Performed by: UROLOGY

## 2017-10-04 PROCEDURE — 85007 BL SMEAR W/DIFF WBC COUNT: CPT | Performed by: UROLOGY

## 2017-10-04 RX ORDER — DOXYCYCLINE 100 MG/1
CAPSULE ORAL
Qty: 6 CAPSULE | Refills: 0 | Status: SHIPPED | OUTPATIENT
Start: 2017-10-04 | End: 2017-10-24

## 2017-10-04 RX ADMIN — Medication 1 APPLICATION: at 10:32

## 2017-10-04 RX ADMIN — GABAPENTIN 100 MG: 100 CAPSULE ORAL at 16:31

## 2017-10-04 RX ADMIN — SERTRALINE HYDROCHLORIDE 100 MG: 100 TABLET ORAL at 10:22

## 2017-10-04 RX ADMIN — METOPROLOL TARTRATE 12.5 MG: 25 TABLET ORAL at 18:04

## 2017-10-04 RX ADMIN — OXYBUTYNIN CHLORIDE 5 MG: 5 TABLET ORAL at 18:04

## 2017-10-04 RX ADMIN — GABAPENTIN 100 MG: 100 CAPSULE ORAL at 10:23

## 2017-10-04 RX ADMIN — ACETAMINOPHEN 650 MG: 325 TABLET, FILM COATED ORAL at 03:54

## 2017-10-04 RX ADMIN — MEROPENEM 1000 MG: 1 INJECTION, POWDER, FOR SOLUTION INTRAVENOUS at 03:54

## 2017-10-04 RX ADMIN — OXYBUTYNIN CHLORIDE 5 MG: 5 TABLET ORAL at 10:24

## 2017-10-04 RX ADMIN — ASPIRIN 81 MG: 81 TABLET, COATED ORAL at 10:24

## 2017-10-04 RX ADMIN — VITAMIN D, TAB 1000IU (100/BT) 2000 UNITS: 25 TAB at 10:22

## 2017-10-04 RX ADMIN — Medication 1 APPLICATION: at 18:05

## 2017-10-04 RX ADMIN — MEROPENEM 1000 MG: 1 INJECTION, POWDER, FOR SOLUTION INTRAVENOUS at 13:19

## 2017-10-04 RX ADMIN — LEVOTHYROXINE SODIUM 25 MCG: 25 TABLET ORAL at 06:07

## 2017-10-04 RX ADMIN — RIVAROXABAN 20 MG: 20 TABLET, FILM COATED ORAL at 10:24

## 2017-10-04 RX ADMIN — ACETAMINOPHEN 650 MG: 325 TABLET, FILM COATED ORAL at 13:29

## 2017-10-04 NOTE — DISCHARGE SUMMARY
Discharge Summary - Te Méndez 68 y o  female MRN: [id number]    [facility] Encounter: [id number]    Admission Date: 10/3/2017     Admitting Diagnosis: Calculus of kidney [N20 0]    HPI: Patient is 68year old female who is POD #1 s/p difficult left ureteral stent exchange  Stent was encrursted  This morning, she feels well  She denies pain, gross hematuria, dysuria, frequency, urgency, chest pain, SOB and recent fever  She had a slight temperature rise to 100 2 overnight but it has now resolved  There is no white count  Plan is discharge today with outpatient follow up in about 2 weeks  Stent needs to be changed every 3-4 months  Procedures Performed: No orders of the defined types were placed in this encounter  Complications: none    Discharge Diagnosis: nephrolithiasis     Condition at Discharge: good     Discharge instructions/Information to patient and family:   See after visit summary for information provided to patient and family  Provisions for Follow-Up Care:  See after visit summary for information related to follow-up care and any pertinent home health orders  Disposition: See After Visit Summary for discharge disposition information  Planned Readmission: No    Discharge Statement   I spent 30 minutes discharging the patient  This time was spent on the day of discharge  I had direct contact with the patient on the day of discharge  Additional documentation is required if more than 30 minutes were spent on discharge  Discharge Medications:  See after visit summary for reconciled discharge medications provided to patient and family

## 2017-10-04 NOTE — SOCIAL WORK
PT from the [facility] at [facility]  She is wheelchair dependent cecelia lift  CM arranged Bariatric BLS with [facility] for 8pm  Cm updated son Patti Leo [phone] he asked if his brother could ride alone with patient in the ambulance  CM called Jaú at [address] and she checked with her supervisor and got approval for patients son to ride in the front

## 2017-10-18 ENCOUNTER — ALLSCRIPTS OFFICE VISIT (OUTPATIENT)
Dept: OTHER | Facility: OTHER | Age: 77
End: 2017-10-18

## 2017-10-24 ENCOUNTER — GENERIC CONVERSION - ENCOUNTER (OUTPATIENT)
Dept: OTHER | Facility: OTHER | Age: 77
End: 2017-10-24

## 2017-11-22 ENCOUNTER — HOSPITAL ENCOUNTER (INPATIENT)
Facility: HOSPITAL | Age: 77
LOS: 8 days | Discharge: NURSING, REHAB OR LONG-TERM CARE | DRG: 698 | End: 2017-12-01
Attending: EMERGENCY MEDICINE | Admitting: INTERNAL MEDICINE
Payer: COMMERCIAL

## 2017-11-22 ENCOUNTER — GENERIC CONVERSION - ENCOUNTER (OUTPATIENT)
Dept: OTHER | Facility: OTHER | Age: 77
End: 2017-11-22

## 2017-11-22 DIAGNOSIS — N20.0 CALCULUS OF KIDNEY: ICD-10-CM

## 2017-11-22 DIAGNOSIS — A40.9 STREPTOCOCCAL SEPSIS (HCC): ICD-10-CM

## 2017-11-22 DIAGNOSIS — N17.9 ACUTE KIDNEY INJURY (HCC): ICD-10-CM

## 2017-11-22 DIAGNOSIS — N28.9 ACUTE RENAL INSUFFICIENCY: ICD-10-CM

## 2017-11-22 DIAGNOSIS — D72.829 LEUKOCYTOSIS: ICD-10-CM

## 2017-11-22 DIAGNOSIS — A41.9 SEPSIS (HCC): ICD-10-CM

## 2017-11-22 DIAGNOSIS — N39.0 UTI (URINARY TRACT INFECTION): Primary | ICD-10-CM

## 2017-11-22 DIAGNOSIS — N20.0 STAGHORN RENAL CALCULUS: ICD-10-CM

## 2017-11-22 LAB
ANION GAP SERPL CALCULATED.3IONS-SCNC: 11 MMOL/L (ref 4–13)
ANISOCYTOSIS BLD QL SMEAR: PRESENT
BASOPHILS # BLD MANUAL: 0 THOUSAND/UL (ref 0–0.1)
BASOPHILS NFR MAR MANUAL: 0 % (ref 0–1)
BUN SERPL-MCNC: 35 MG/DL (ref 5–25)
CALCIUM SERPL-MCNC: 10.6 MG/DL (ref 8.3–10.1)
CHLORIDE SERPL-SCNC: 105 MMOL/L (ref 100–108)
CO2 SERPL-SCNC: 25 MMOL/L (ref 21–32)
CREAT SERPL-MCNC: 2.24 MG/DL (ref 0.6–1.3)
EOSINOPHIL # BLD MANUAL: 0 THOUSAND/UL (ref 0–0.4)
EOSINOPHIL NFR BLD MANUAL: 0 % (ref 0–6)
ERYTHROCYTE [DISTWIDTH] IN BLOOD BY AUTOMATED COUNT: 15.1 % (ref 11.6–15.1)
GFR SERPL CREATININE-BSD FRML MDRD: 21 ML/MIN/1.73SQ M
GLUCOSE SERPL-MCNC: 120 MG/DL (ref 65–140)
HCT VFR BLD AUTO: 36.3 % (ref 34.8–46.1)
HGB BLD-MCNC: 11.7 G/DL (ref 11.5–15.4)
LACTATE SERPL-SCNC: 2.1 MMOL/L (ref 0.5–2)
LACTATE SERPL-SCNC: 2.7 MMOL/L (ref 0.5–2)
LYMPHOCYTES # BLD AUTO: 1.05 THOUSAND/UL (ref 0.6–4.47)
LYMPHOCYTES # BLD AUTO: 6 % (ref 14–44)
MCH RBC QN AUTO: 28.4 PG (ref 26.8–34.3)
MCHC RBC AUTO-ENTMCNC: 32.2 G/DL (ref 31.4–37.4)
MCV RBC AUTO: 88 FL (ref 82–98)
METAMYELOCYTES NFR BLD MANUAL: 1 % (ref 0–1)
MONOCYTES # BLD AUTO: 0.7 THOUSAND/UL (ref 0–1.22)
MONOCYTES NFR BLD: 4 % (ref 4–12)
NEUTROPHILS # BLD MANUAL: 15.47 THOUSAND/UL (ref 1.85–7.62)
NEUTS BAND NFR BLD MANUAL: 17 % (ref 0–8)
NEUTS SEG NFR BLD AUTO: 71 % (ref 43–75)
NRBC BLD AUTO-RTO: 0 /100 WBCS
PLATELET # BLD AUTO: 208 THOUSANDS/UL (ref 149–390)
PLATELET BLD QL SMEAR: ADEQUATE
PMV BLD AUTO: 9.5 FL (ref 8.9–12.7)
POIKILOCYTOSIS BLD QL SMEAR: PRESENT
POTASSIUM SERPL-SCNC: 3.9 MMOL/L (ref 3.5–5.3)
RBC # BLD AUTO: 4.12 MILLION/UL (ref 3.81–5.12)
SODIUM SERPL-SCNC: 141 MMOL/L (ref 136–145)
TOTAL CELLS COUNTED SPEC: 100
VARIANT LYMPHS # BLD AUTO: 1 %
WBC # BLD AUTO: 17.58 THOUSAND/UL (ref 4.31–10.16)

## 2017-11-22 PROCEDURE — 83605 ASSAY OF LACTIC ACID: CPT | Performed by: EMERGENCY MEDICINE

## 2017-11-22 PROCEDURE — 87040 BLOOD CULTURE FOR BACTERIA: CPT | Performed by: EMERGENCY MEDICINE

## 2017-11-22 PROCEDURE — 36415 COLL VENOUS BLD VENIPUNCTURE: CPT | Performed by: EMERGENCY MEDICINE

## 2017-11-22 PROCEDURE — 80048 BASIC METABOLIC PNL TOTAL CA: CPT | Performed by: EMERGENCY MEDICINE

## 2017-11-22 PROCEDURE — 85027 COMPLETE CBC AUTOMATED: CPT | Performed by: EMERGENCY MEDICINE

## 2017-11-22 PROCEDURE — 96360 HYDRATION IV INFUSION INIT: CPT

## 2017-11-22 PROCEDURE — 85007 BL SMEAR W/DIFF WBC COUNT: CPT | Performed by: EMERGENCY MEDICINE

## 2017-11-22 RX ORDER — LEVOFLOXACIN 5 MG/ML
750 INJECTION, SOLUTION INTRAVENOUS ONCE
Status: COMPLETED | OUTPATIENT
Start: 2017-11-22 | End: 2017-11-23

## 2017-11-22 RX ORDER — ACETAMINOPHEN 325 MG/1
650 TABLET ORAL ONCE
Status: COMPLETED | OUTPATIENT
Start: 2017-11-22 | End: 2017-11-22

## 2017-11-22 RX ORDER — CIPROFLOXACIN 500 MG/1
500 TABLET, FILM COATED ORAL EVERY 12 HOURS SCHEDULED
COMMUNITY
Start: 2017-11-22 | End: 2017-12-01 | Stop reason: HOSPADM

## 2017-11-22 RX ADMIN — SODIUM CHLORIDE 1000 ML: 0.9 INJECTION, SOLUTION INTRAVENOUS at 23:19

## 2017-11-22 RX ADMIN — ACETAMINOPHEN 650 MG: 325 TABLET, FILM COATED ORAL at 21:58

## 2017-11-22 NOTE — ED PROVIDER NOTES
History  Chief Complaint   Patient presents with    Possible UTI     Pt sent from [facility], for suspected UTI  Pt also c/o leg pain from her neuropathy  70-year-old female presents for evaluation of possible urinary tract infection  Patient had reported fever last evening at the nursing home [facility]  Also has reported urinary frequency  She denies abdominal pain, back pain, flank pain, cough, URI symptoms, diarrhea, constipation  Patient is a patient of Dr Lupe Post of Urology  Patient had left ureteral stent exchange performed on October 3rd  History provided by:  Patient and medical records      Prior to Admission Medications   Prescriptions Last Dose Informant Patient Reported? Taking?    Carboxymethylcellul-Glycerin (REFRESH OPTIVE) 0 5-0 9 % SOLN   Yes Yes   Sig: Apply 1 drop to eye 2 (two) times a day   Cholecalciferol (VITAMIN D3) 2000 units TABS   Yes Yes   Sig: Take 2,000 Units by mouth daily   Melatonin 3 MG CAPS   Yes Yes   Sig: Take 3 mg by mouth daily at bedtime   Menthol, Topical Analgesic, (BIOFREEZE) 4 % GEL   Yes Yes   Sig: Apply topically every 8 (eight) hours as needed For shoulder pain     Multiple Vitamin (MULTIVITAMIN) tablet   Yes Yes   Sig: Take 1 tablet by mouth daily   OLANZAPINE PO   Yes Yes   Sig: Take 2 5 mg by mouth 2 (two) times a day Take 2 5 mg in am and 5 mg at bedtime    OXYBUTYNIN CHLORIDE ER PO   Yes Yes   Sig: Take 5 mg by mouth daily   acetaminophen (TYLENOL) 325 mg tablet   Yes Yes   Sig: Take 650 mg by mouth every 4 (four) hours as needed for mild pain     aspirin (ECOTRIN LOW STRENGTH) 81 mg EC tablet   Yes Yes   Sig: Take 81 mg by mouth daily   bisacodyl (DULCOLAX) 10 mg suppository   Yes Yes   Sig: Insert 10 mg into the rectum daily as needed for constipation   ciprofloxacin (CIPRO) 500 mg tablet   Yes Yes   Sig: Take 500 mg by mouth every 12 (twelve) hours   fexofenadine (ALLEGRA) 60 MG tablet   Yes Yes   Sig: Take 60 mg by mouth 2 (two) times a day     gabapentin (NEURONTIN) 100 mg capsule   Yes Yes   Sig: Take 100 mg by mouth 3 (three) times a day   levothyroxine 25 mcg tablet   Yes Yes   Sig: Take 25 mcg by mouth daily   magnesium hydroxide (MILK OF MAGNESIA) 400 mg/5 mL oral suspension   Yes Yes   Sig: Take 30 mL by mouth daily as needed for constipation   metoprolol tartrate (LOPRESSOR) 25 mg tablet   Yes Yes   Sig: Take 25 mg by mouth every 12 (twelve) hours   miconazole (REMEDY ANTIFUNGAL) 2 % powder   Yes Yes   Sig: Apply 1 application topically 3 (three) times a day   rivaroxaban (XARELTO) 20 mg tablet   Yes Yes   Sig: Take 20 mg by mouth daily with breakfast   schraggers   Yes Yes   Sig: Apply 1 application topically 2 (two) times a day Apply to right and left buttocks for excoriation      sertraline (ZOLOFT) 100 mg tablet   Yes Yes   Sig: Take 100 mg by mouth daily   simethicone (MYLICON) 80 mg chewable tablet   Yes Yes   Sig: Chew 80 mg every 6 (six) hours as needed for flatulence   sodium chloride (OCEAN) 0 65 % nasal spray   Yes Yes   Si spray into each nostril daily   sodium phosphate (FLEET) enema   Yes Yes   Sig: Insert 1 enema into the rectum as needed follow package directions      Facility-Administered Medications: None       Past Medical History:   Diagnosis Date    Abnormality of gait and mobility     Anemia     Anxiety     Calculus of kidney     Candidiasis of female genitalia     Clostridium difficile infection     History of C-Diff    Constipation     CVA [facility]cerebral vascular accident) ([facility] [address] )     Dementia     Dementia with psychosis     Depression     Diabetes mellitus (HCC)     No medications    Dry eye syndrome     Dysphagia     Dysuria     Food allergy     matias/papaya    GERD (gastroesophageal reflux disease)     Hand injury     site of mrsa infection- nurse at nursing home denies knowledge of this    Hematuria     Hemiplegia and hemiparesis ([address] )     left    Hyperlipidemia     Hypertension     Hypothyroid     Insomnia     Irregular heart beat     A-fib    Muscle weakness     left side    Nephrolithiasis     Neuropathy     Paroxysmal atrial fibrillation (HCC)     Polyneuropathy     Seasonal allergies     Seasonal allergies     Shortness of breath     Stroke (HCC)     left sided weakness    TIA (transient ischemic attack)     Ureteral stent retained     replacement today 10/3/2017    Urinary incontinence     UTI (urinary tract infection)     Wears dentures     full set    Wears glasses     Wheelchair bound        Past Surgical History:   Procedure Laterality Date    CARPAL TUNNEL RELEASE Right     CYSTOSCOPY      HYSTERECTOMY      LITHOTRIPSY      TN CYSTOSCOPY,INSERT URETERAL STENT Left 7/14/2017    Procedure: CYSTOSCOPY, laser bladder and left urethal stone, bilateral retrograde pyelogram, left uteroscopy, and left stent placement;  Surgeon: Wilbur Coulter MD;  Location: [facility];  Service: Urology    TN CYSTOSCOPY,INSERT URETERAL STENT Left 10/3/2017    Procedure: CYSTOSCOPY, EXCHANGE STENT URETERAL;  Surgeon: Wilbur Coulter MD;  Location: [facility];  Service: Urology    SALPINGOOPHORECTOMY Bilateral     URETEROSCOPY Left 10/3/2017    Procedure: URETEROSCOPY;  Surgeon: Wilbur Coulter MD;  Location[facility];  Service: Urology       Family History   Problem Relation Age of Onset    Diabetes Mother      I have reviewed and agree with the history as documented  Social History   Substance Use Topics    Smoking status: Never Smoker    Smokeless tobacco: Never Used    Alcohol use No        Review of Systems   Constitutional: Positive for fever  Negative for activity change, appetite change and fatigue  HENT: Negative for congestion, dental problem, ear pain, rhinorrhea and sore throat  Eyes: Negative for pain and redness  Respiratory: Negative for chest tightness, shortness of breath and wheezing  Cardiovascular: Negative for chest pain and palpitations  Gastrointestinal: Negative for abdominal pain, blood in stool, constipation, diarrhea, nausea and vomiting  Endocrine: Negative for cold intolerance and heat intolerance  Genitourinary: Positive for frequency  Negative for dysuria and hematuria  Musculoskeletal: Negative for arthralgias and myalgias  Skin: Negative for color change, pallor and rash  Neurological: Negative for weakness and numbness  Hematological: Does not bruise/bleed easily  Psychiatric/Behavioral: Negative for agitation, hallucinations and suicidal ideas  Physical Exam  ED Triage Vitals [11/22/17 1803]   Temperature Pulse Respirations Blood Pressure SpO2   98 °F (36 7 °C) 94 16 103/58 97 %      Temp Source Heart Rate Source Patient Position - Orthostatic VS BP Location FiO2 (%)   Oral Monitor Sitting Right arm --      Pain Score       3           Orthostatic Vital Signs  Vitals:    11/23/17 0137 11/23/17 0759 11/23/17 0954 11/23/17 1131   BP: (!) 102/48 (!) 80/65 (!) 88/48 (!) 86/48   Pulse: 97 (!) 114     Patient Position - Orthostatic VS: Lying Lying  Lying       Physical Exam   Constitutional: She is oriented to person, place, and time  She appears well-developed and well-nourished  HENT:   Mouth/Throat: No oropharyngeal exudate  TMs normal bilaterally no pharyngeal erythema no rhinorrhea nontender palpation of sinuses, normal looking turbinates   Eyes: Conjunctivae and EOM are normal    Neck: Normal range of motion  Neck supple  No meningeal signs   Cardiovascular: Normal rate, regular rhythm, normal heart sounds and intact distal pulses  Pulmonary/Chest: Effort normal and breath sounds normal  No respiratory distress  She has no wheezes  She has no rales  She exhibits no tenderness  Abdominal: Soft  Bowel sounds are normal  She exhibits no distension and no mass  There is no tenderness  No hernia  No cvat   Musculoskeletal: Normal range of motion  She exhibits no edema     Lymphadenopathy:     She has no cervical adenopathy  Neurological: She is alert and oriented to person, place, and time  No cranial nerve deficit  Skin: No rash noted  No erythema  No edema   Psychiatric: She has a normal mood and affect  Her behavior is normal    Nursing note and vitals reviewed        ED Medications  Medications   sodium chloride 0 9 % infusion (0 mL/hr Intravenous Stopped 11/23/17 1223)   acetaminophen (TYLENOL) tablet 650 mg (650 mg Oral Given 11/23/17 1325)   aspirin (ECOTRIN LOW STRENGTH) EC tablet 81 mg ( Oral MAR Unhold 11/23/17 1311)   bisacodyl (DULCOLAX) rectal suppository 10 mg ( Rectal MAR Unhold 11/23/17 1311)   cholecalciferol (VITAMIN D3) tablet 2,000 Units ( Oral MAR Unhold 11/23/17 1311)   gabapentin (NEURONTIN) capsule 100 mg ( Oral MAR Unhold 11/23/17 1311)   levothyroxine tablet 25 mcg ( Oral MAR Unhold 11/23/17 1311)   magnesium hydroxide (MILK OF MAGNESIA) 400 mg/5 mL oral suspension 30 mL ( Oral MAR Unhold 11/23/17 1311)   melatonin tablet 3 mg ( Oral MAR Unhold 11/23/17 1311)   miconazole 2 % cream ( Topical MAR Unhold 11/23/17 1311)   OLANZapine (ZyPREXA) tablet 2 5 mg ( Oral MAR Unhold 11/23/17 1311)   oxybutynin (DITROPAN-XL) 24 hr tablet 5 mg ( Oral MAR Unhold 11/23/17 1311)   rivaroxaban (XARELTO) tablet 20 mg ( Oral MAR Unhold 11/23/17 1311)   sertraline (ZOLOFT) tablet 100 mg ( Oral MAR Unhold 11/23/17 1311)   simethicone (MYLICON) chewable tablet 80 mg ( Oral MAR Unhold 11/23/17 1311)   sodium chloride (OCEAN) 0 65 % nasal spray 1 spray ( Each Nare MAR Unhold 11/23/17 1311)   lidocaine (LIDODERM) 5 % patch 1 patch ( Transdermal MAR Unhold 11/23/17 1311)   ondansetron (ZOFRAN) injection 4 mg ( Intravenous MAR Unhold 11/23/17 1311)   influenza inactivated quadrivalent vaccine (FLULAVAL) IM injection 0 5 mL ( Intramuscular MAR Unhold 11/23/17 1311)   josegers paste 1 application (not administered)   sodium chloride 0 9 % bolus 1,000 mL ( Intravenous Canceled Entry 11/23/17 1321)     Followed by sodium chloride 0 9 % bolus 1,000 mL (0 mL Intravenous Stopped 11/23/17 1451)     Followed by   sodium chloride 0 9 % bolus 1,000 mL (0 mL Intravenous Stopped 11/23/17 1600)     Followed by   sodium chloride 0 9 % bolus 1,000 mL (1,000 mL Intravenous New Bag 11/23/17 1559)   ertapenem (INVanz) 500 mg in sodium chloride 0 9 % 50 mL IVPB (not administered)   sodium chloride 0 9 % bolus 1,000 mL (0 mL Intravenous Stopped 11/23/17 0104)   levofloxacin (LEVAQUIN) IVPB (premix) 750 mg (0 mg Intravenous Stopped 11/23/17 1314)   acetaminophen (TYLENOL) tablet 650 mg (650 mg Oral Given 11/22/17 2158)   HYDROmorphone (DILAUDID) 1 mg/mL injection 0 4 mg (0 4 mg Intravenous Given 11/23/17 0058)   sodium chloride 0 9 % bolus 1,000 mL (0 mL Intravenous Stopped 11/23/17 1339)       Diagnostic Studies  Results Reviewed     Procedure Component Value Units Date/Time    Lactic acid x2 Q2H [[id number]]  (Abnormal) Collected:  11/23/17 0023    Lab Status:  Final result Specimen:  Blood from Arm, Left Updated:  11/23/17 0054     LACTIC ACID 2 6 (HH) mmol/L     Narrative:         Result may be elevated if tourniquet was used during collection  Lactic acid x2 Q2H [[id number]]  (Abnormal) Collected:  11/22/17 2257    Lab Status:  Final result Specimen:  Blood from Arm, Right Updated:  11/22/17 2346     LACTIC ACID 2 1 (HH) mmol/L     Narrative:         Result may be elevated if tourniquet was used during collection  Blood culture #2 [[id number]] Collected:  11/22/17 2319    Lab Status: In process Specimen:  Blood from Arm, Right Updated:  11/22/17 2327    Blood culture #1 [[id number]] Collected:  11/22/17 2319    Lab Status:   In process Specimen:  Blood from Arm, Right Updated:  11/22/17 2327    Lactic acid, plasma [[id number]]  (Abnormal) Collected:  11/22/17 2058    Lab Status:  Final result Specimen:  Blood from Arm, Right Updated:  11/22/17 2132     LACTIC ACID 2 7 (HH) mmol/L     Narrative:         Result may be elevated if tourniquet was used during collection  CBC and differential [[id number]]  (Abnormal) Collected:  11/22/17 2058    Lab Status:  Final result Specimen:  Blood from Arm, Right Updated:  11/22/17 2125     WBC 17 58 (H) Thousand/uL      RBC 4 12 Million/uL      Hemoglobin 11 7 g/dL      Hematocrit 36 3 %      MCV 88 fL      MCH 28 4 pg      MCHC 32 2 g/dL      RDW 15 1 %      MPV 9 5 fL      Platelets 720 Thousands/uL      nRBC 0 /100 WBCs     Narrative: This is an appended report  These results have been appended to a previously verified report  Basic metabolic panel [[id number]]  (Abnormal) Collected:  11/22/17 2058    Lab Status:  Final result Specimen:  Blood from Arm, Right Updated:  11/22/17 2115     Sodium 141 mmol/L      Potassium 3 9 mmol/L      Chloride 105 mmol/L      CO2 25 mmol/L      Anion Gap 11 mmol/L      BUN 35 (H) mg/dL      Creatinine 2 24 (H) mg/dL      Glucose 120 mg/dL      Calcium 10 6 (H) mg/dL      eGFR 21 ml/min/1 73sq m     Narrative:         National Kidney Disease Education Program recommendations are as follows:  GFR calculation is accurate only with a steady state creatinine  Chronic Kidney disease less than 60 ml/min/1 73 sq  meters  Kidney failure less than 15 ml/min/1 73 sq  meters  POCT urinalysis dipstick [[id number]]     Lab Status:  No result Specimen:  Urine                  US kidney and bladder    (Results Pending)              Procedures  Procedures       Phone Contacts  ED Phone Contact    ED Course  ED Course                          Initial Sepsis Screening     [address] Name 11/23/17 1314 11/23/17 0100             Is the patient's history suggestive of a new or worsening infection? (!)  Yes (Proceed)  -TB (!)  Yes (Proceed)  -MH       Suspected source of infection urinary tract infection  -TB urinary tract infection  -MH       Are two or more of the following signs & symptoms of infection both present and new to the patient?            Indicate SIRS criteria Hyperthemia > 38 3C (100  9F); Leukocytosis (WBC > 85359 IJL)  -TB Leukocytosis (WBC > 08272 IJL); Altered mental status  -       If the answer is yes to both questions, suspicion of sepsis is present  [de-identified]         If severe sepsis is present AND tissue hypoperfusion perists in the hour after fluid resuscitation or lactate > 4, the patient meets criteria for SEPTIC SHOCK           Are any of the following organ dysfunction criteria present within 6 hours of suspected infection and SIRS criteria that are NOT considered to be chronic conditions? (!)  Yes  -TB         Organ dysfunction Lactate > 2 0 mmol/L  -TB Lactate > 2 0 mmol/L;Creatinine > 2 0 mg/dL  -       Date of presentation of severe sepsis 11/23/17  -         Time of presentation of severe sepsis 1314  -TB         Tissue hypoperfusion persists in the hour after crystalloid fluid administration, evidenced, by either: SBP < 90 mm/Hg ( ___ mm/Hg in comment field)  -TB         Was hypotension present within one hour of the conclusion of crystalloid fluid administration?  No  -       Date of presentation of septic shock           Time of presentation of septic shock             User Key  (r) = Recorded By, (t) = Taken By, (c) = Cosigned By    [address] Name Provider Type    TB KATHIA Ceron Nurse Practitioner    Elisabet Garay MD Physician           Default Flowsheet Data (last 720 hours)      Sepsis Reassessment     Row Name 11/23/17 1500                   Volume Status and Tissue Perfusion Post Fluid Resuscitation- Must Document ALL of the Following:    Vital Signs Reviewed Yes  -TB        Cardio Normal S1/S2; Regular rate and rhythm  -TB        Pulmonary Normal effort;Clear to auscultation  -TB        Capillary Refill Brisk  -TB        Peripheral Pulses Radial;Dorsalis Pedis  -TB        Peripheral Pulse +2  -TB        Dorsalis Pedis +2  -TB        Skin Warm;Dry  -TB           *OR*   Intensive Monitoring- Must Document Two * of the Following Four *: Vital Signs Reviewed          * Central Venous Pressure (CVP or RAP)          * Central Venous Oxygen (SVO2, ScvO2 or Oxygen saturation via central catheter)          * Bedside Cardiovascular US in IVC diameter and % collapse          * Passive Leg Raise OR Crystalloid Challenge            User Key  (r) = Recorded By, (t) = Taken By, (c) = Cosigned By    Initials Name Provider Type    TB Adal Ortiz, 10 Kristen Cisneros Nurse Practitioner                MDM  Number of Diagnoses or Management Options  Diagnosis management comments: Increased urinary frequency and feverWill do septic workup, reassess for disposition  CritCare Time    Disposition  Final diagnoses:   UTI (urinary tract infection)   Leukocytosis   Acute renal insufficiency     Time reflects when diagnosis was documented in both MDM as applicable and the Disposition within this note     Time User Action Codes Description Comment    11/23/2017 12:16 AM Tiffany Catalino Add [N39 0] UTI (urinary tract infection)     11/23/2017 12:16 AM Tiffany Catalino Add [D72 829] Leukocytosis     11/23/2017 12:16 AM Ganesh Garza B Add [N28 9] Acute renal insufficiency     11/23/2017  1:20 AM Cici Portillo Add [A41 9] Sepsis ([facility] [address] )     11/23/2017  1:20 AM Ellena Aschoff Modify [A41 9] Sepsis ([facility] [address] )     11/23/2017  1:20 PM Sisi Shin Add [N20 0] Staghorn renal calculus       ED Disposition     ED Disposition Condition Comment    Admit  Case was discussed with Stephanie Echevarria and the patient's admission status was agreed to be Admission Status: inpatient status to the service of Dr Wilner Urena           Follow-up Information    None       Current Discharge Medication List      CONTINUE these medications which have NOT CHANGED    Details   acetaminophen (TYLENOL) 325 mg tablet Take 650 mg by mouth every 4 (four) hours as needed for mild pain        aspirin (ECOTRIN LOW STRENGTH) 81 mg EC tablet Take 81 mg by mouth daily      bisacodyl (DULCOLAX) 10 mg suppository Insert 10 mg into the rectum daily as needed for constipation      Carboxymethylcellul-Glycerin (REFRESH OPTIVE) 0 5-0 9 % SOLN Apply 1 drop to eye 2 (two) times a day      Cholecalciferol (VITAMIN D3) 2000 units TABS Take 2,000 Units by mouth daily      ciprofloxacin (CIPRO) 500 mg tablet Take 500 mg by mouth every 12 (twelve) hours      fexofenadine (ALLEGRA) 60 MG tablet Take 60 mg by mouth 2 (two) times a day        gabapentin (NEURONTIN) 100 mg capsule Take 100 mg by mouth 3 (three) times a day      levothyroxine 25 mcg tablet Take 25 mcg by mouth daily      magnesium hydroxide (MILK OF MAGNESIA) 400 mg/5 mL oral suspension Take 30 mL by mouth daily as needed for constipation      Melatonin 3 MG CAPS Take 3 mg by mouth daily at bedtime      Menthol, Topical Analgesic, (BIOFREEZE) 4 % GEL Apply topically every 8 (eight) hours as needed For shoulder pain        metoprolol tartrate (LOPRESSOR) 25 mg tablet Take 25 mg by mouth every 12 (twelve) hours      miconazole (REMEDY ANTIFUNGAL) 2 % powder Apply 1 application topically 3 (three) times a day      Multiple Vitamin (MULTIVITAMIN) tablet Take 1 tablet by mouth daily      OLANZAPINE PO Take 2 5 mg by mouth 2 (two) times a day Take 2 5 mg in am and 5 mg at bedtime       OXYBUTYNIN CHLORIDE ER PO Take 5 mg by mouth daily      rivaroxaban (XARELTO) 20 mg tablet Take 20 mg by mouth daily with breakfast      schraggers Apply 1 application topically 2 (two) times a day Apply to right and left buttocks for excoriation  sertraline (ZOLOFT) 100 mg tablet Take 100 mg by mouth daily      simethicone (MYLICON) 80 mg chewable tablet Chew 80 mg every 6 (six) hours as needed for flatulence      sodium chloride (OCEAN) 0 65 % nasal spray 1 spray into each nostril daily      sodium phosphate (FLEET) enema Insert 1 enema into the rectum as needed follow package directions           No discharge procedures on file      ED Provider  Electronically Signed by           Wan Howell MD  11/23/17 [address]

## 2017-11-23 PROBLEM — A40.9 STREPTOCOCCAL SEPSIS (HCC): Status: ACTIVE | Noted: 2017-11-23

## 2017-11-23 PROBLEM — A40.9 STREPTOCOCCAL SEPSIS (HCC): Status: RESOLVED | Noted: 2017-11-23 | Resolved: 2017-11-23

## 2017-11-23 PROBLEM — A41.9 SEPSIS (HCC): Status: ACTIVE | Noted: 2017-11-23

## 2017-11-23 PROBLEM — N20.0 STAGHORN RENAL CALCULUS: Status: ACTIVE | Noted: 2017-11-23

## 2017-11-23 PROBLEM — N17.9 ACUTE KIDNEY INJURY (HCC): Status: ACTIVE | Noted: 2017-11-23

## 2017-11-23 PROBLEM — N39.0 COMPLICATED URINARY TRACT INFECTION: Status: ACTIVE | Noted: 2017-11-23

## 2017-11-23 PROBLEM — R26.2 AMBULATORY DYSFUNCTION: Status: ACTIVE | Noted: 2017-11-23

## 2017-11-23 LAB
ANION GAP SERPL CALCULATED.3IONS-SCNC: 12 MMOL/L (ref 4–13)
BACTERIA UR QL AUTO: ABNORMAL /HPF
BILIRUB UR QL STRIP: NEGATIVE
BUN SERPL-MCNC: 40 MG/DL (ref 5–25)
CALCIUM SERPL-MCNC: 9.7 MG/DL (ref 8.3–10.1)
CHLORIDE SERPL-SCNC: 106 MMOL/L (ref 100–108)
CLARITY UR: ABNORMAL
CO2 SERPL-SCNC: 21 MMOL/L (ref 21–32)
COLOR UR: ABNORMAL
CREAT SERPL-MCNC: 2.34 MG/DL (ref 0.6–1.3)
ERYTHROCYTE [DISTWIDTH] IN BLOOD BY AUTOMATED COUNT: 15.4 % (ref 11.6–15.1)
GFR SERPL CREATININE-BSD FRML MDRD: 19 ML/MIN/1.73SQ M
GLUCOSE SERPL-MCNC: 126 MG/DL (ref 65–140)
GLUCOSE UR STRIP-MCNC: NEGATIVE MG/DL
HCT VFR BLD AUTO: 31.8 % (ref 34.8–46.1)
HGB BLD-MCNC: 10.2 G/DL (ref 11.5–15.4)
HGB UR QL STRIP.AUTO: ABNORMAL
KETONES UR STRIP-MCNC: NEGATIVE MG/DL
LACTATE SERPL-SCNC: 1.5 MMOL/L (ref 0.5–2)
LACTATE SERPL-SCNC: 2.1 MMOL/L (ref 0.5–2)
LACTATE SERPL-SCNC: 2.6 MMOL/L (ref 0.5–2)
LEUKOCYTE ESTERASE UR QL STRIP: ABNORMAL
MCH RBC QN AUTO: 27.9 PG (ref 26.8–34.3)
MCHC RBC AUTO-ENTMCNC: 32.1 G/DL (ref 31.4–37.4)
MCV RBC AUTO: 87 FL (ref 82–98)
NITRITE UR QL STRIP: POSITIVE
NON-SQ EPI CELLS URNS QL MICRO: ABNORMAL /HPF
OTHER STN SPEC: ABNORMAL
PH UR STRIP.AUTO: 7 [PH] (ref 4.5–8)
PLATELET # BLD AUTO: 190 THOUSANDS/UL (ref 149–390)
PMV BLD AUTO: 9.5 FL (ref 8.9–12.7)
POTASSIUM SERPL-SCNC: 4.2 MMOL/L (ref 3.5–5.3)
PROT UR STRIP-MCNC: ABNORMAL MG/DL
RBC # BLD AUTO: 3.65 MILLION/UL (ref 3.81–5.12)
RBC #/AREA URNS AUTO: ABNORMAL /HPF
SODIUM SERPL-SCNC: 139 MMOL/L (ref 136–145)
SP GR UR STRIP.AUTO: 1.01 (ref 1–1.03)
UROBILINOGEN UR QL STRIP.AUTO: 0.2 E.U./DL
WBC # BLD AUTO: 13.21 THOUSAND/UL (ref 4.31–10.16)
WBC #/AREA URNS AUTO: ABNORMAL /HPF

## 2017-11-23 PROCEDURE — 36415 COLL VENOUS BLD VENIPUNCTURE: CPT | Performed by: EMERGENCY MEDICINE

## 2017-11-23 PROCEDURE — 85027 COMPLETE CBC AUTOMATED: CPT | Performed by: INTERNAL MEDICINE

## 2017-11-23 PROCEDURE — 81001 URINALYSIS AUTO W/SCOPE: CPT | Performed by: INTERNAL MEDICINE

## 2017-11-23 PROCEDURE — 87186 SC STD MICRODIL/AGAR DIL: CPT | Performed by: NURSE PRACTITIONER

## 2017-11-23 PROCEDURE — 99284 EMERGENCY DEPT VISIT MOD MDM: CPT

## 2017-11-23 PROCEDURE — 80048 BASIC METABOLIC PNL TOTAL CA: CPT | Performed by: INTERNAL MEDICINE

## 2017-11-23 PROCEDURE — 83605 ASSAY OF LACTIC ACID: CPT | Performed by: NURSE PRACTITIONER

## 2017-11-23 PROCEDURE — 83605 ASSAY OF LACTIC ACID: CPT | Performed by: INTERNAL MEDICINE

## 2017-11-23 PROCEDURE — 87077 CULTURE AEROBIC IDENTIFY: CPT | Performed by: NURSE PRACTITIONER

## 2017-11-23 PROCEDURE — 87186 SC STD MICRODIL/AGAR DIL: CPT | Performed by: INTERNAL MEDICINE

## 2017-11-23 PROCEDURE — 83605 ASSAY OF LACTIC ACID: CPT | Performed by: EMERGENCY MEDICINE

## 2017-11-23 PROCEDURE — 87147 CULTURE TYPE IMMUNOLOGIC: CPT | Performed by: INTERNAL MEDICINE

## 2017-11-23 PROCEDURE — 87086 URINE CULTURE/COLONY COUNT: CPT | Performed by: INTERNAL MEDICINE

## 2017-11-23 PROCEDURE — 87040 BLOOD CULTURE FOR BACTERIA: CPT | Performed by: NURSE PRACTITIONER

## 2017-11-23 PROCEDURE — 87077 CULTURE AEROBIC IDENTIFY: CPT | Performed by: INTERNAL MEDICINE

## 2017-11-23 RX ORDER — FENTANYL CITRATE 50 UG/ML
50 INJECTION, SOLUTION INTRAMUSCULAR; INTRAVENOUS ONCE
Status: DISCONTINUED | OUTPATIENT
Start: 2017-11-23 | End: 2017-11-23

## 2017-11-23 RX ORDER — BISACODYL 10 MG
10 SUPPOSITORY, RECTAL RECTAL DAILY PRN
Status: DISCONTINUED | OUTPATIENT
Start: 2017-11-23 | End: 2017-12-01 | Stop reason: HOSPADM

## 2017-11-23 RX ORDER — LIDOCAINE 50 MG/G
1 PATCH TOPICAL DAILY
Status: DISCONTINUED | OUTPATIENT
Start: 2017-11-23 | End: 2017-12-01 | Stop reason: HOSPADM

## 2017-11-23 RX ORDER — ASPIRIN 81 MG/1
81 TABLET ORAL DAILY
Status: DISCONTINUED | OUTPATIENT
Start: 2017-11-23 | End: 2017-11-26

## 2017-11-23 RX ORDER — LANOLIN ALCOHOL/MO/W.PET/CERES
3 CREAM (GRAM) TOPICAL
Status: DISCONTINUED | OUTPATIENT
Start: 2017-11-23 | End: 2017-12-01 | Stop reason: HOSPADM

## 2017-11-23 RX ORDER — LEVOTHYROXINE SODIUM 0.03 MG/1
25 TABLET ORAL
Status: DISCONTINUED | OUTPATIENT
Start: 2017-11-23 | End: 2017-12-01 | Stop reason: HOSPADM

## 2017-11-23 RX ORDER — SODIUM CHLORIDE 9 MG/ML
75 INJECTION, SOLUTION INTRAVENOUS CONTINUOUS
Status: DISCONTINUED | OUTPATIENT
Start: 2017-11-23 | End: 2017-11-29

## 2017-11-23 RX ORDER — SODIUM CHLORIDE 9 MG/ML
50 INJECTION, SOLUTION INTRAVENOUS CONTINUOUS
Status: DISCONTINUED | OUTPATIENT
Start: 2017-11-23 | End: 2017-11-23

## 2017-11-23 RX ORDER — MELATONIN
2000 DAILY
Status: DISCONTINUED | OUTPATIENT
Start: 2017-11-23 | End: 2017-11-29

## 2017-11-23 RX ORDER — SIMETHICONE 80 MG
80 TABLET,CHEWABLE ORAL EVERY 6 HOURS PRN
Status: DISCONTINUED | OUTPATIENT
Start: 2017-11-23 | End: 2017-12-01 | Stop reason: HOSPADM

## 2017-11-23 RX ORDER — SERTRALINE HYDROCHLORIDE 100 MG/1
100 TABLET, FILM COATED ORAL DAILY
Status: DISCONTINUED | OUTPATIENT
Start: 2017-11-23 | End: 2017-12-01 | Stop reason: HOSPADM

## 2017-11-23 RX ORDER — ACETAMINOPHEN 325 MG/1
650 TABLET ORAL EVERY 6 HOURS SCHEDULED
Status: DISCONTINUED | OUTPATIENT
Start: 2017-11-23 | End: 2017-12-01 | Stop reason: HOSPADM

## 2017-11-23 RX ORDER — OLANZAPINE 5 MG/1
2.5 TABLET ORAL 2 TIMES DAILY
Status: DISCONTINUED | OUTPATIENT
Start: 2017-11-23 | End: 2017-12-01 | Stop reason: HOSPADM

## 2017-11-23 RX ORDER — LORATADINE 10 MG/1
10 TABLET ORAL DAILY
Status: DISCONTINUED | OUTPATIENT
Start: 2017-11-23 | End: 2017-11-23

## 2017-11-23 RX ORDER — ECHINACEA PURPUREA EXTRACT 125 MG
1 TABLET ORAL DAILY
Status: DISCONTINUED | OUTPATIENT
Start: 2017-11-23 | End: 2017-12-01 | Stop reason: HOSPADM

## 2017-11-23 RX ORDER — ONDANSETRON 2 MG/ML
4 INJECTION INTRAMUSCULAR; INTRAVENOUS EVERY 6 HOURS PRN
Status: DISCONTINUED | OUTPATIENT
Start: 2017-11-23 | End: 2017-12-01 | Stop reason: HOSPADM

## 2017-11-23 RX ORDER — OXYBUTYNIN CHLORIDE 5 MG/1
5 TABLET, EXTENDED RELEASE ORAL DAILY
Status: DISCONTINUED | OUTPATIENT
Start: 2017-11-23 | End: 2017-12-01 | Stop reason: HOSPADM

## 2017-11-23 RX ORDER — GABAPENTIN 100 MG/1
100 CAPSULE ORAL 3 TIMES DAILY
Status: DISCONTINUED | OUTPATIENT
Start: 2017-11-23 | End: 2017-12-01 | Stop reason: HOSPADM

## 2017-11-23 RX ADMIN — LEVOTHYROXINE SODIUM 25 MCG: 25 TABLET ORAL at 06:28

## 2017-11-23 RX ADMIN — OXYBUTYNIN CHLORIDE 5 MG: 5 TABLET, EXTENDED RELEASE ORAL at 09:53

## 2017-11-23 RX ADMIN — SODIUM CHLORIDE 125 ML/HR: 0.9 INJECTION, SOLUTION INTRAVENOUS at 21:47

## 2017-11-23 RX ADMIN — SODIUM CHLORIDE 1000 ML: 0.9 INJECTION, SOLUTION INTRAVENOUS at 22:48

## 2017-11-23 RX ADMIN — MELATONIN TAB 3 MG 3 MG: 3 TAB at 21:47

## 2017-11-23 RX ADMIN — RIVAROXABAN 20 MG: 20 TABLET, FILM COATED ORAL at 08:00

## 2017-11-23 RX ADMIN — SODIUM CHLORIDE 1000 ML: 0.9 INJECTION, SOLUTION INTRAVENOUS at 15:59

## 2017-11-23 RX ADMIN — OLANZAPINE 2.5 MG: 5 TABLET, FILM COATED ORAL at 09:55

## 2017-11-23 RX ADMIN — SODIUM CHLORIDE 1000 ML: 0.9 INJECTION, SOLUTION INTRAVENOUS at 16:50

## 2017-11-23 RX ADMIN — GABAPENTIN 100 MG: 100 CAPSULE ORAL at 09:53

## 2017-11-23 RX ADMIN — SODIUM CHLORIDE 1000 ML: 0.9 INJECTION, SOLUTION INTRAVENOUS at 14:50

## 2017-11-23 RX ADMIN — SERTRALINE HYDROCHLORIDE 100 MG: 100 TABLET ORAL at 09:53

## 2017-11-23 RX ADMIN — GABAPENTIN 100 MG: 100 CAPSULE ORAL at 17:43

## 2017-11-23 RX ADMIN — DIAPER RASH SKIN PROTECTENT 1 APPLICATION: at 17:43

## 2017-11-23 RX ADMIN — LEVOFLOXACIN 750 MG: 5 INJECTION, SOLUTION INTRAVENOUS at 01:04

## 2017-11-23 RX ADMIN — SODIUM CHLORIDE 1000 MG: 9 INJECTION, SOLUTION INTRAVENOUS at 04:07

## 2017-11-23 RX ADMIN — HYDROMORPHONE HYDROCHLORIDE 0.4 MG: 1 INJECTION, SOLUTION INTRAMUSCULAR; INTRAVENOUS; SUBCUTANEOUS at 00:58

## 2017-11-23 RX ADMIN — MICONAZOLE NITRATE: 2 CREAM TOPICAL at 17:43

## 2017-11-23 RX ADMIN — Medication 1 SPRAY: at 09:56

## 2017-11-23 RX ADMIN — ACETAMINOPHEN 650 MG: 325 TABLET ORAL at 17:43

## 2017-11-23 RX ADMIN — SODIUM CHLORIDE 100 ML/HR: 0.9 INJECTION, SOLUTION INTRAVENOUS at 02:53

## 2017-11-23 RX ADMIN — LIDOCAINE 1 PATCH: 50 PATCH CUTANEOUS at 09:54

## 2017-11-23 RX ADMIN — ACETAMINOPHEN 650 MG: 325 TABLET ORAL at 06:28

## 2017-11-23 RX ADMIN — MICONAZOLE NITRATE: 2 CREAM TOPICAL at 09:56

## 2017-11-23 RX ADMIN — ACETAMINOPHEN 650 MG: 325 TABLET ORAL at 13:25

## 2017-11-23 RX ADMIN — SODIUM CHLORIDE 1000 ML: 0.9 INJECTION, SOLUTION INTRAVENOUS at 13:37

## 2017-11-23 RX ADMIN — ASPIRIN 81 MG: 81 TABLET, COATED ORAL at 09:54

## 2017-11-23 RX ADMIN — GABAPENTIN 100 MG: 100 CAPSULE ORAL at 21:47

## 2017-11-23 RX ADMIN — OLANZAPINE 2.5 MG: 5 TABLET, FILM COATED ORAL at 17:43

## 2017-11-23 RX ADMIN — LORATADINE 10 MG: 10 TABLET ORAL at 09:53

## 2017-11-23 RX ADMIN — SODIUM CHLORIDE 1000 ML: 0.9 INJECTION, SOLUTION INTRAVENOUS at 12:23

## 2017-11-23 RX ADMIN — VITAMIN D, TAB 1000IU (100/BT) 2000 UNITS: 25 TAB at 09:54

## 2017-11-23 RX ADMIN — SODIUM CHLORIDE 125 ML/HR: 0.9 INJECTION, SOLUTION INTRAVENOUS at 23:43

## 2017-11-23 NOTE — SEPSIS NOTE
Sepsis Note   Viktor Felipe 68 y o  female MRN: [id number]  Unit/Bed#: ICU 01 Encounter: [id number]            Initial Sepsis Screening     Row Name 11/23/17 1314 11/23/17 0100             Is the patient's history suggestive of a new or worsening infection? (!)  Yes (Proceed)  -TB (!)  Yes (Proceed)  -       Suspected source of infection urinary tract infection  -TB urinary tract infection  -       Are two or more of the following signs & symptoms of infection both present and new to the patient?          Indicate SIRS criteria Hyperthemia > 38 3C (100 9F); Leukocytosis (WBC > 30460 IJL)  -TB Leukocytosis (WBC > 60734 IJL); Altered mental status  -       If the answer is yes to both questions, suspicion of sepsis is present  [de-identified]         If severe sepsis is present AND tissue hypoperfusion perists in the hour after fluid resuscitation or lactate > 4, the patient meets criteria for SEPTIC SHOCK           Are any of the following organ dysfunction criteria present within 6 hours of suspected infection and SIRS criteria that are NOT considered to be chronic conditions? (!)  Yes  -TB         Organ dysfunction Lactate > 2 0 mmol/L  -TB Lactate > 2 0 mmol/L;Creatinine > 2 0 mg/dL  -       Date of presentation of severe sepsis 11/23/17  -         Time of presentation of severe sepsis 1314  -TB         Tissue hypoperfusion persists in the hour after crystalloid fluid administration, evidenced, by either: SBP < 90 mm/Hg ( ___ mm/Hg in comment field)  -TB         Was hypotension present within one hour of the conclusion of crystalloid fluid administration?    No  -       Date of presentation of septic shock           Time of presentation of septic shock             User Key  (r) = Recorded By, (t) = Taken By, (c) = Cosigned By    [address] Name Provider Type    TB KATHIA Mccain Nurse Practitioner    Carlitos Lundy MD Physician               Default Flowsheet Data (last 720 hours)      Sepsis Reassessment     Row Name 11/23/17 1500                   Volume Status and Tissue Perfusion Post Fluid Resuscitation- Must Document ALL of the Following:    Vital Signs Reviewed Yes  -TB        Cardio Normal S1/S2; Regular rate and rhythm  -TB        Pulmonary Normal effort;Clear to auscultation  -TB        Capillary Refill Brisk  -TB        Peripheral Pulses Radial;Dorsalis Pedis  -TB        Peripheral Pulse +2  -TB        Dorsalis Pedis +2  -TB        Skin Warm;Dry  -TB           *OR*   Intensive Monitoring- Must Document Two * of the Following Four *:    Vital Signs Reviewed          * Central Venous Pressure (CVP or RAP)          * Central Venous Oxygen (SVO2, ScvO2 or Oxygen saturation via central catheter)          * Bedside Cardiovascular US in IVC diameter and % collapse          * Passive Leg Raise OR Crystalloid Challenge            User Key  (r) = Recorded By, (t) = Taken By, (c) = Cosigned By    Initials Name Provider Type    TB Anand Zavaleta, 10 Kristen Cisneros Nurse Practitioner

## 2017-11-23 NOTE — ED NOTES
RN unsuccessful with IV attempt  Patient states, "you can only stick me once so I hope you're a good stick " Patient requesting ultrasound guided IV at this time  RN who can perform ultrasound guided IV currently unavailable   Patient verbalized understanding that there will be a delay on receiving IV/bloodwork due to her request       Irene Hernandez RN  11/22/17 1944

## 2017-11-23 NOTE — H&P
History and Physical - [facility] Internal Medicine    Patient Information: Mando Medina 68 y o  female MRN: [id number]  Unit/Bed#: ED 10 Encounter: [id number]  Admitting Physician: Kyra Guo MD  PCP: Anthony Gutierrez MD  Date of Admission:  11/23/17    Assessment/Plan:    Hospital Problem List:     Principal Problem:    Sepsis [facility])  Active Problems:    Complicated urinary tract infection    Acute kidney injury ([address] )    Staghorn renal calculus    Ambulatory dysfunction      Plan for the Primary Problem(s):  · Sepsis present on admission secondary to complicated urinary tract infection  Follow-up blood cultures and urine cultures  If patient is unable to get urine sample with straight catheterize her to obtain urine  Continue with IV fluids and follow up on lactic acid level  Blood cultures were sent in the emergency room  · Complicated urinary tract infection in the setting of multiple ureteric stents and  chronic staghorn calculus:  Continue with intravenous antibiotic  Follow up on culture results  Infectious Disease consultation will be obtained to tailor antibiotic regimen and to consider prophylactic suppressive antibiotic for future  · Acute kidney injury with baseline creatinine less than 1  Avoid nephrotoxins medication  Follow up on bladder scan  If patient develops urinary retention will follow urine retention protocol  Follow up on renal ultrasound  · Hypothyroidism  Continue thyroid replacement therapy  · Ambulatory dysfunction  Maintain fall precautions  · Intractable pain secondary to UTI and calculus  Continue with analgesic regimen  VTE Prophylaxis: Heparin  / sequential compression device   Code Status: Level 3 DNR  POLST: POLST form is on file already (pre-hospital)    Anticipated Length of Stay:  Patient will be admitted on an Inpatient basis with an anticipated length of stay of  >2 midnights     Justification for Hospital Stay:  management of sepsis and IV antibiotics  Total Time for Visit, including Counseling / Coordination of Care: 1 hour  Greater than 50% of this total time spent on direct patient counseling and coordination of care  Chief Complaint:   Left flank pain  History of Present Illness:    Sumit Do is a 68 y o  female who presents with fever and left flank pain at the nursing home  The patient has a history of chronic staghorn calculus and has had multiple ureteric stents placed bilaterally  Her stent usually removed every 4-6 months  However in October when it was attempted to remove her left ureteric stent, it was on successful because of increased stage in and she had a 2nd ureteric stent put in on the left side  Patient has had multiple urinary tract infections including infection with ESBL positive Klebsiella  History wasprovided by her 2 sons who was there at bedside   According to patient's son, she has been increasingly confused over the last few days  She has been complaining of bladder spasm with micturition  Patient also complains of dysuria and urine frequency  She also complained of hematuria  For the last 2 days she developed left flank pain radiating to her groin and along her left thigh  She also developed  fever of 101 with rigors at the nursing home last night  Her last hospitalization was in October of 2017 when she had a new left ureteric stent placed by Dr Linwood Hay    Review of Systems:    Review of Systems   Constitutional: Positive for activity change, chills and fever  HENT: Negative  Eyes: Negative  Respiratory: Negative  Cardiovascular: Negative  Gastrointestinal: Positive for abdominal pain and nausea  Endocrine: Negative  Genitourinary: Positive for difficulty urinating, dysuria, flank pain, frequency, hematuria and urgency  Musculoskeletal: Positive for gait problem  Allergic/Immunologic: Negative  Hematological: Negative  Psychiatric/Behavioral: Positive for confusion  Past Medical and Surgical History:     Past Medical History:   Diagnosis Date    Abnormality of gait and mobility     Anemia     Anxiety     Calculus of kidney     Candidiasis of female genitalia     Clostridium difficile infection     History of C-Diff    Constipation     Dementia     Dementia with psychosis     Depression     Diabetes mellitus (HCC)     No medications    Dry eye syndrome     Dysphagia     Dysuria     Food allergy     matias/papaya    GERD (gastroesophageal reflux disease)     Hand injury     site of mrsa infection- nurse at nursing home denies knowledge of this    Hematuria     Hemiplegia and hemiparesis ([address] )     left    Hyperlipidemia     Hypertension     Hypothyroid     Insomnia     Irregular heart beat     A-fib    Muscle weakness     left side    Nephrolithiasis     Neuropathy     Paroxysmal atrial fibrillation (HCC)     Polyneuropathy     Seasonal allergies     Seasonal allergies     Shortness of breath     Stroke (HCC)     left sided weakness    TIA (transient ischemic attack)     Ureteral stent retained     replacement today 10/3/2017    Urinary incontinence     UTI (urinary tract infection)     Wears dentures     full set    Wears glasses     Wheelchair bound        Past Surgical History:   Procedure Laterality Date    CARPAL TUNNEL RELEASE Right     CYSTOSCOPY      HYSTERECTOMY      LITHOTRIPSY      [place] CYSTOSCOPY,INSERT URETERAL STENT Left 7/14/2017    Procedure: CYSTOSCOPY, laser bladder and left urethal stone, bilateral retrograde pyelogram, left uteroscopy, and left stent placement;  Surgeon: Caridad Romberg, MD;  [facility];  Service: Urology    [place] CYSTOSCOPY,INSERT URETERAL STENT Left 10/3/2017    Procedure: CYSTOSCOPY, EXCHANGE STENT URETERAL;  Surgeon: Caridad Romberg, MD;  [facility];  Service: Urology    SALPINGOOPHORECTOMY Bilateral     URETEROSCOPY Left 10/3/2017    Procedure: URETEROSCOPY;  Surgeon: Kev Patel Chantal Rodriguez MD;  Location: [facility] OR;  Service: Urology       Meds/Allergies:    Prior to Admission medications    Medication Sig Start Date End Date Taking?  Authorizing Provider   acetaminophen (TYLENOL) 325 mg tablet Take 650 mg by mouth every 4 (four) hours as needed for mild pain     Yes Historical Provider, MD   aspirin (ECOTRIN LOW STRENGTH) 81 mg EC tablet Take 81 mg by mouth daily   Yes Historical Provider, MD   bisacodyl (DULCOLAX) 10 mg suppository Insert 10 mg into the rectum daily as needed for constipation   Yes Historical Provider, MD   Carboxymethylcellul-Glycerin (REFRESH OPTIVE) 0 5-0 9 % SOLN Apply 1 drop to eye 2 (two) times a day   Yes Historical Provider, MD   Cholecalciferol (VITAMIN D3) 2000 units TABS Take 2,000 Units by mouth daily   Yes Historical Provider, MD   ciprofloxacin (CIPRO) 500 mg tablet Take 500 mg by mouth every 12 (twelve) hours 11/22/17 11/29/17 Yes Historical Provider, MD   fexofenadine (ALLEGRA) 60 MG tablet Take 60 mg by mouth 2 (two) times a day     Yes Historical Provider, MD   gabapentin (NEURONTIN) 100 mg capsule Take 100 mg by mouth 3 (three) times a day   Yes Historical Provider, MD   levothyroxine 25 mcg tablet Take 25 mcg by mouth daily   Yes Historical Provider, MD   magnesium hydroxide (MILK OF MAGNESIA) 400 mg/5 mL oral suspension Take 30 mL by mouth daily as needed for constipation   Yes Historical Provider, MD   Melatonin 3 MG CAPS Take 3 mg by mouth daily at bedtime   Yes Historical Provider, MD   Menthol, Topical Analgesic, (BIOFREEZE) 4 % GEL Apply topically every 8 (eight) hours as needed For shoulder pain     Yes Historical Provider, MD   metoprolol tartrate (LOPRESSOR) 25 mg tablet Take 25 mg by mouth every 12 (twelve) hours   Yes Historical Provider, MD   miconazole (REMEDY ANTIFUNGAL) 2 % powder Apply 1 application topically 3 (three) times a day   Yes Historical Provider, MD   Multiple Vitamin (MULTIVITAMIN) tablet Take 1 tablet by mouth daily   Yes Historical Provider, MD   OLANZAPINE PO Take 2 5 mg by mouth 2 (two) times a day Take 2 5 mg in am and 5 mg at bedtime    Yes Historical Provider, MD   OXYBUTYNIN CHLORIDE ER PO Take 5 mg by mouth daily   Yes Historical Provider, MD   rivaroxaban (XARELTO) 20 mg tablet Take 20 mg by mouth daily with breakfast   Yes Historical Provider, MD topete Apply 1 application topically 2 (two) times a day Apply to right and left buttocks for excoriation  Yes Historical Provider, MD   sertraline (ZOLOFT) 100 mg tablet Take 100 mg by mouth daily   Yes Historical Provider, MD   simethicone (MYLICON) 80 mg chewable tablet Chew 80 mg every 6 (six) hours as needed for flatulence   Yes Historical Provider, MD   sodium chloride (OCEAN) 0 65 % nasal spray 1 spray into each nostril daily   Yes Historical Provider, MD   sodium phosphate (FLEET) enema Insert 1 enema into the rectum as needed follow package directions   Yes Historical Provider, MD     I have reviewed home medications with patient family member  Allergies: Allergies   Allergen Reactions    Clindamycin Hives and Other (See Comments)     Hives in throat      Alendronate Other (See Comments)     unknown    Butorphanol Other (See Comments)     unknown    Cephalosporins Other (See Comments)     unknown    Epinephrine Other (See Comments)     unknown    Erythromycin Other (See Comments)     unknown    Makena Flavor Other (See Comments)     unknown    Omeprazole Other (See Comments)     unknown    Papaya Derivatives Other (See Comments)     unknown    Vancomycin Other (See Comments)     unknown       Social History:     Marital Status:       Substance Use History:   History   Alcohol Use No     History   Smoking Status    Never Smoker   Smokeless Tobacco    Never Used     History   Drug Use No       Family History:    non-contributory    Physical Exam:     Vitals:   Blood Pressure: 107/55 (11/23/17 0030)  Pulse: 98 (11/23/17 0030)  Temperature: 98 °F (36 7 °C) (11/22/17 1803)  Temp Source: Oral (11/22/17 1803)  Respirations: 18 (11/23/17 0030)  SpO2: 98 % (11/23/17 0030)    Physical Exam   Constitutional: She is oriented to person, place, and time  She appears well-developed  She appears distressed  HENT:   Head: Normocephalic and atraumatic  Eyes: Conjunctivae are normal  Pupils are equal, round, and reactive to light  Neck: Neck supple  No JVD present  Cardiovascular: Normal rate and regular rhythm  Pulmonary/Chest: Effort normal and breath sounds normal    Abdominal: Soft  Bowel sounds are normal    Left flank tenderness   Musculoskeletal: She exhibits no edema  Neurological: She is alert and oriented to person, place, and time  Skin: Skin is warm  She is diaphoretic  Vitals reviewed  Additional Data:     Lab Results: I have personally reviewed pertinent reports  Results from last 7 days  Lab Units 11/22/17 2058   WBC Thousand/uL 17 58*   HEMOGLOBIN g/dL 11 7   HEMATOCRIT % 36 3   PLATELETS Thousands/uL 208   LYMPHO PCT % 6*   MONO PCT MAN % 4   EOSINO PCT MANUAL % 0       Results from last 7 days  Lab Units 11/22/17 2058   SODIUM mmol/L 141   POTASSIUM mmol/L 3 9   CHLORIDE mmol/L 105   CO2 mmol/L 25   BUN mg/dL 35*   CREATININE mg/dL 2 24*   CALCIUM mg/dL 10 6*   GLUCOSE RANDOM mg/dL 120           Imaging: I have personally reviewed pertinent reports  No results found  EKG, Pathology, and Other Studies Reviewed on Admission:   · EKG:  Reviewed  [facility] Epic [facility] Reviewed: Yes     ** Please Note: This note has been constructed using a voice recognition system   **

## 2017-11-23 NOTE — CONSULTS
Consultation - Infectious Disease   Mat Marin 68 y o  female MRN: [id number]  Unit/Bed#: ICU 01 Encounter: [id number]      IMPRESSION & RECOMMENDATIONS:   Impression/Recommendations:    1  Severe sepsis, POA  Tachycardia, leukocytosis, complicated by hypotension and lactic acidosis  Likely secondary to #2  Tachycardia and leukocytosis improving  Lactic acidosis trending down  Blood cultures are pending   -  Antibiotics as below  - monitor WBC count and temperatures  - monitor hemodynamics  - follow-up blood cultures      2  Probable UTI/pyelonephritis  No urinalysis or urine culture have been sent  Blood cultures are pending  No imaging done due to elevated creatinine  Reviewed prior urine cultures  Urine culture from 09/15/2017 grew greater than 100,000 Proteus, > 100,000  ESBL Klebsiella, and  pansensitive E coli  Urine culture from 07/14/2017 grew ESBL Klebsiella and Proteus  As there is a chronic left-sided renal calculus and old encrusted stent in place, there is very likely a component of bacterial colonization  However, in the setting of severe sepsis, empiric antibiotics are appropriate     - agree with ertapenem  Decrease dose to 500 mg IV daily based on current renal function  Unfortunately, no urine was sent for analysis as patient was refusing a straight cath  Critical care to try and obtain a sample via straight cath  Results may not be as helpful post-antibiotics  -  Follow-up blood cultures  - Follow-up renal ultrasound  - urology to evaluate     3  LALITA  In the setting of #1  Prior creatinine at [facility] in 11/17 was 0 7  Currently receiving IV fluids  - monitor creatinine closely  - will dose adjust antibiotics based on renal function      4   History of left staghorn calculus status post encrusted old ureteral stent and new ureteral stent placed in 10/17   - urology evaluation  - follow-up renal ultrasound    Antibiotics:   ertapenem D2    Spoke with Kaleigh Hoff from [phone] regarding plan  Thank you for this consultation  We will follow along with you  HISTORY OF PRESENT ILLNESS:  Reason for Consult:  Urosepsis    HPI: Ras Giraldo is a 68 y o  female  admitted on 11/22 from her nursing home with complaint of fever and left flank pain,  as well as increasing confusion  Patient has a history of large left staghorn calculus with an indwelling stent that is exchanged every 4-6 months  In July 2017, the existing stent was found to be encrusted by the stone and could not be removed, so a new stent was placed alongside the old  She had a recent admission in October for ureteral stent exchange of the new stent, which was very difficult  The old stent remains in place  She received gentamicin intraop and doxycycline for 3 days postop at that time  Patient has had multiple previous cultures which grew ESBL Klebsiella, but does not appear like they were treated, and she was not seen by infectious disease in the past   Patient was admitted initially to the medical floor, but ultimately was transferred to step-down unit as she became hypotensive overnight, which seems to have responded to IV fluids so far  Patient feels like her pain is subsiding  She also feels like her fevers are better  Denies any nausea or vomiting  No diarrhea  She was refusing a straight catheterization for urine sample  REVIEW OF SYSTEMS:    A complete system-based review of systems is otherwise negative      PAST MEDICAL HISTORY:  Past Medical History:   Diagnosis Date    Abnormality of gait and mobility     Anemia     Anxiety     Calculus of kidney     Candidiasis of female genitalia     Clostridium difficile infection     History of C-Diff    Constipation     CVA [facility]cerebral vascular accident) ([facility] )     Dementia     Dementia with psychosis     Depression     Diabetes mellitus ([facility] )     No medications    Dry eye syndrome     Dysphagia     Dysuria     Food allergy     matias/papaya    GERD (gastroesophageal reflux disease)     Hand injury     site of mrsa infection- nurse at nursing home denies knowledge of this    Hematuria     Hemiplegia and hemip[address]is [address] )     left    Hyperlipidemia     Hypertension     Hypothyroid     Insomnia     Irregular heart beat     A-fib    Muscle weakness     left side    Nephrolithiasis     Neuropathy     Paroxysmal atrial fibrillation (HCC)     Polyneuropathy     Seasonal allergies     Seasonal allergies     Shortness of breath     Stroke (HCC)     left sided weakness    TIA (transient ischemic attack)     Ureteral stent retained     replacement today 10/3/2017    Urinary incontinence     UTI (urinary tract infection)     Wears dentures     full set    Wears glasses     Wheelchair bound      Past Surgical History:   Procedure Laterality Date    CARPAL TUNNEL RELEASE Right     CYSTOSCOPY      HYSTERECTOMY      LITHOTRIPSY      [place] CYSTOSCOPY,INSERT URETERAL STENT Left 7/14/2017    Procedure: CYSTOSCOPY, laser bladder and left urethal stone, bilateral retrograde pyelogram, left uteroscopy, and left stent placement;  Surgeon: Thomas Templeton MD;  [facility];  Service: Urology    [place] CYSTOSCOPY,INSERT URETERAL STENT Left 10/3/2017    Procedure: María Napier, EXCHANGE STENT URETERAL;  Surgeon: Thomas Templeton MD;  [facility];  Service: Urology    SALPINGOOPHORECTOMY Bilateral     URETEROSCOPY Left 10/3/2017    Procedure: URETEROSCOPY;  Surgeon: Thomas Templeton MD;  [facility];  Service: Urology       FAMILY HISTORY:  Non-contributory    SOCIAL HISTORY:  History   Alcohol Use No     History   Drug Use No     History   Smoking Status    Never Smoker   Smokeless Tobacco    Never Used       ALLERGIES:  Allergies   Allergen Reactions    Clindamycin Hives and Other (See Comments)     Hives in throat      Alendronate Other (See Comments)     unknown    Butorphanol Other (See Comments)     unknown    Cephalosporins Other (See Comments)     unknown    Epinephrine Other (See Comments)     unknown    Erythromycin Other (See Comments)     unknown    Selah Flavor Other (See Comments)     unknown    Omeprazole Other (See Comments)     unknown    Papaya Derivatives Other (See Comments)     unknown    Vancomycin Other (See Comments)     unknown       MEDICATIONS:  All current active medications have been reviewed  PHYSICAL EXAM:  Vitals:  HR:  [] 114  Resp:  [16-18] 18  BP: ()/(48-65) 86/48  SpO2:  [93 %-98 %] 95 %  Temp (24hrs), Av 2 °F (36 8 °C), Min:97 6 °F (36 4 °C), Max:98 6 °F (37 °C)  Current: Temperature: 98 6 °F (37 °C)     Physical Exam:  General:  Well-nourished, well-developed, in no acute distress  Eyes:  Conjunctive clear with no hemorrhages or effusions  Oropharynx:  No ulcers, no lesions  Neck:  Supple, no lymphadenopathy  Lungs:  Clear to auscultation bilaterally, no accessory muscle use  Cardiac:  Regular rate and rhythm, no murmurs  Abdomen:  Soft, non-tender, non-distended,  Obese  Extremities:  No peripheral cyanosis, clubbing, or edema  Skin:  No rashes, no ulcers  Neurological:  Moves all four extremities spontaneously, sensation grossly intact      LABS, IMAGING, & OTHER STUDIES:  Lab Results:  I have personally reviewed pertinent labs      Results from last 7 days  Lab Units 17   SODIUM mmol/L 139 141   POTASSIUM mmol/L 4 2 3 9   CHLORIDE mmol/L 106 105   CO2 mmol/L 21 25   ANION GAP mmol/L 12 11   BUN mg/dL 40* 35*   CREATININE mg/dL 2 34* 2 24*   EGFR ml/min/1 73sq m 19 21   GLUCOSE RANDOM mg/dL 126 120   CALCIUM mg/dL 9 7 10 6*       Results from last 7 days  Lab Units 17  205   WBC Thousand/uL 13 21* 17 58*   HEMOGLOBIN g/dL 10 2* 11 7   PLATELETS Thousands/uL 190 208

## 2017-11-23 NOTE — PROGRESS NOTES
Progress Note - Critical Care Acceptance Note   Chinedu Guidry 68 y o  female MRN: [id number]  Unit/Bed#: ICU 01 Encounter: [id number]    Assessment/Plan:  1  Sepsis with septic shock due to UTI  · Transferred to stepdown unit for closer monitoring due to hyoptension  · Follow sepsis protocol, provide with 30 ml/kg of normal saline now  · Continue to trend lactic acid level  · Monitor blood pressure, goal map>65   · If hypotension continues may need IV pressors  · Check blood cultures today  · Check urine culture  · Agree with consult to ID due to ESBL history  2  UTI present on admission, with history of ESBL klebsiella  · Has frequent UTI due to staghorn calculus  · Consult urology, they were planning to exchange stent  · Will order renal ultrasound to r/o hydronephrosis  3  LALITA  · Unsure of baseline creatinine due to no blood work in system  · Will provide with IVF hydration and check bmp tomorrow  4  Atrial fibrillation  · Hold on Metoprolol due to hypotension, continue on Xarelto  5  History of CVA with residual deficits  · Once more stable with consult pt/ot  6  History of staghorn calculus with multiple stents  · C/s urology   · Renal ultrasound pending    _____________________________________________________________________    HPI/24hr events:   68year old female that presented from a nursing home due to temperature of 101 5, rigors, and left sided flank pain  She has a history of a chronic staghorn calculus and has had multiples stents placed in the past  In October 2017 the urologist was unable to remove her left ureteric stent and placed an additional stent at that time  Was to follow up with urology and have stent changed every 3-4 months due to recurrent infections  She has had multiple urinary tract infections with ESBL positive Klebsiella  She presented to the ER yesterday evening with fevers and overnight became hypotensive  Has been started on IVF hydration   She was planned to have ureteric stent replaced on Tuesday by the urologist due to recurrent infections  Due to hypotension was transferred to the stepdown unit for closer monitoring  At this time she has no complaints  Denies chest pain or shortness of breath  Denies burning or urgency with urination  Medications:    [MAR Hold] acetaminophen 650 mg Oral Q6H [facility]   [MAR Hold] aspirin 81 mg Oral Daily   [MAR Hold] Carboxymethylcellul-Glycerin 1 drop Ophthalmic BID   [MAR Hold] cholecalciferol 2,000 Units Oral Daily   [MAR Hold] ertapenem 1,000 mg Intravenous Q24H   [MAR Hold] gabapentin 100 mg Oral TID   [MAR Hold] levothyroxine 25 mcg Oral Early Morning   [MAR Hold] lidocaine 1 patch Transdermal Daily   [MAR Hold] loratadine 10 mg Oral Daily   [MAR Hold] melatonin 3 mg Oral HS   [MAR Hold] metoprolol tartrate 25 mg Oral Q12H [facility]   [MAR Hold] miconazole  Topical BID   [MAR Hold] OLANZapine 2 5 mg Oral BID   [MAR Hold] oxybutynin 5 mg Oral Daily   [MAR Hold] rivaroxaban 20 mg Oral Daily With Breakfast   [MAR Hold] sertraline 100 mg Oral Daily   [MAR Hold] sodium chloride 1 spray Each Nare Daily   sodium chloride 1,000 mL Intravenous Once         sodium chloride 125 mL/hr Last Rate: 100 mL/hr (11/23/17 0253)         Physical exam:  Vitals: Body mass index is 39 43 kg/m²  Blood pressure (!) 86/48, pulse (!) 114, temperature 98 5 °F (36 9 °C), temperature source Tympanic, resp   rate 18, height 5' 6" (1 676 m), weight 111 kg (244 lb 4 3 oz), SpO2 95 %, not currently breastfeeding ,  Temp  Min: 97 6 °F (36 4 °C)  Max: 98 5 °F (36 9 °C)  IBW: 59 3 kg    SpO2: 95 %  SpO2 Activity: At Rest  O2 Device: None (Room air)      Intake/Output Summary (Last 24 hours) at 11/23/17 1303  Last data filed at 11/23/17 0501   Gross per 24 hour   Intake          1363 33 ml   Output                0 ml   Net          1363 33 ml       Invasive/non-invasive ventilation settings:   Respiratory    Lab Data (Last 4 hours)    None         O2/Vent Data (Last 4 hours)    None Invasive Devices     Peripheral Intravenous Line            Peripheral IV 11/23/17 Left Antecubital less than 1 day          Drain            Ureteral Drain/Stent Left ureter 7 Fr  51 days                  Physical Exam:  Gen:  Alert and oriented, NAD  HEENT:  PERRL, EOMI, left sided of face with chronic facial droop, o/p clear  Neck:  Supple, no jvd, no adenopathy  Chest: CTA  Cor: RRR, no murmurs, rubs, or gallops  Abd:  Soft, non-tender to palpation, non-distended, bowel sounds present  Ext:  Moves right extremities without difficulty, left arm flaccid, and left lower extremity 3/5 strength due to stroke residual  Neuro: CN II-XII grossly intact  Skin:  Warm, dry, excoriation bilateral groin and buttocks and posterior thighs      Diagnostic Data:  Lab: I have personally reviewed pertinent lab results  CBC:     Results from last 7 days  Lab Units 11/23/17  0614 11/22/17 2058   WBC Thousand/uL 13 21* 17 58*   HEMOGLOBIN g/dL 10 2* 11 7   HEMATOCRIT % 31 8* 36 3   PLATELETS Thousands/uL 190 208       CMP:     Results from last 7 days  Lab Units 11/23/17  0614 11/22/17 2058   SODIUM mmol/L 139 141   POTASSIUM mmol/L 4 2 3 9   CHLORIDE mmol/L 106 105   CO2 mmol/L 21 25   BUN mg/dL 40* 35*   CREATININE mg/dL 2 34* 2 24*   CALCIUM mg/dL 9 7 10 6*   GLUCOSE RANDOM mg/dL 126 120     PT/INR:   No results found for: PT, INR,   Magnesium:     Phosphorous:       Microbiology:        Imaging:  Renal ultrasound pending    Cardiac lab/EKG/telemetry/ECHO:   Atrial fibrillation    VTE Prophylaxis: Xarelto    Code Status: Level 3 - DNAR and DNI    KATHIA Mason    Portions of the record may have been created with voice recognition software  Occasional wrong word or "sound a like" substitutions may have occurred due to the inherent limitations of voice recognition software  Read the chart carefully and recognize, using context, where substitutions have occurred

## 2017-11-23 NOTE — PLAN OF CARE
Reviewed pts chart  Her sbp is 88  She usually is in the 140s with her bp  She denies lightheaded, dizziness, or blurred vision  She does not want to be straight cath for urine nor does she want a hernández  She urinated in room and it was very dark and strong smelling  No sob no f/c no cp no n/v/d no abd pain    cv tachy s1 s2  pulm ctabl    1  Sepsis due to complicated uti- pt has multiple resistance in the past  Agree with invanz  Agree with id consult  2  Lactic acidosis- due to sepsis  Continue IVf  Check lactic acid in 2 hours    3  Hypotension- in which the pt is asymptomatic  will bolus 1 liter of ivf  Will check bp after bolus  Will increase IVF  If no improvement will consider rebolus of IVF  May need to consider lauro to see if true read if pt remains asymptomatic  However pt may need pressors or steroids due to severity of sepesis  Will discuss with critical care  4  michelle- baseline creatinine in care everywhere is 0 7- 1 0  pt refuses hernández  Will bladder scan  Await u/s  Hold nephrotoxic medications  Continue IVF  Correct hypotension  5  afib with rvr with history of paf- will bolus with fluid  Likely due to sepsis  Metoprolol held this am due to hypotension  If no improvement with treatment of sepsis  Pt may need amiodarone  Would not use dig due to michelle  Spoke to critical care   Will transfer to icu

## 2017-11-23 NOTE — ED NOTES
Spoke to patients nurse, Baljeet Fuller, at [facility]  Made her aware that patient will be staying in hospital [facility]  Patients son mention that the patient had a POLST, requested copy from [address] [place] [address] who states that they cannot find it in the patients records        Dennis Tarango RN  11/23/17 3090

## 2017-11-23 NOTE — ED NOTES
Patient unable to urinate on bed pan at this time  Patient refusing straight cath  Dr Pa Long made aware   No further orders at this time     Flash Shaikh, RN  11/23/17 7190

## 2017-11-23 NOTE — ED CARE HANDOFF
Emergency Department Sign Out Note        Sign out and transfer of care from [facility]  See Separate Emergency Department note  The patient, Chinedu Guidry, was evaluated by the previous provider for Urinary problems  Workup Completed:  History and Physical    ED Course / Workup Pending (followup): Labs and dispo   May need d/w Dr Abdiaziz Castellanos (urology) for L ureteral stent     Results for orders placed or performed during the hospital encounter of 11/22/17   CBC and differential   Result Value Ref Range    WBC 17 58 (H) 4 31 - 10 16 Thousand/uL    RBC 4 12 3 81 - 5 12 Million/uL    Hemoglobin 11 7 11 5 - 15 4 g/dL    Hematocrit 36 3 34 8 - 46 1 %    MCV 88 82 - 98 fL    MCH 28 4 26 8 - 34 3 pg    MCHC 32 2 31 4 - 37 4 g/dL    RDW 15 1 11 6 - 15 1 %    MPV 9 5 8 9 - 12 7 fL    Platelets 555 403 - 869 Thousands/uL    nRBC 0 /100 WBCs   Basic metabolic panel   Result Value Ref Range    Sodium 141 136 - 145 mmol/L    Potassium 3 9 3 5 - 5 3 mmol/L    Chloride 105 100 - 108 mmol/L    CO2 25 21 - 32 mmol/L    Anion Gap 11 4 - 13 mmol/L    BUN 35 (H) 5 - 25 mg/dL    Creatinine 2 24 (H) 0 60 - 1 30 mg/dL    Glucose 120 65 - 140 mg/dL    Calcium 10 6 (H) 8 3 - 10 1 mg/dL    eGFR 21 ml/min/1 73sq m   Lactic acid, plasma   Result Value Ref Range    LACTIC ACID 2 7 (HH) 0 5 - 2 0 mmol/L   Lactic acid x2 Q2H   Result Value Ref Range    LACTIC ACID 2 6 (HH) 0 5 - 2 0 mmol/L   Lactic acid x2 Q2H   Result Value Ref Range    LACTIC ACID 2 1 (HH) 0 5 - 2 0 mmol/L   Manual Differential(PHLEBS Do Not Order)   Result Value Ref Range    Segmented % 71 43 - 75 %    Bands % 17 (H) 0 - 8 %    Lymphocytes % 6 (L) 14 - 44 %    Monocytes % 4 4 - 12 %    Eosinophils % 0 0 - 6 %    Basophils % 0 0 - 1 %    Metamyelocytes% 1 0 - 1 %    Atypical Lymphocytes % 1 (H) <=0 %    Absolute Neutrophils 15 47 (H) 1 85 - 7 62 Thousand/uL    Lymphocytes Absolute 1 05 0 60 - 4 47 Thousand/uL    Monocytes Absolute 0 70 0 00 - 1 22 Thousand/uL    Eosinophils Absolute 0 00 0 00 - 0 40 Thousand/uL    Basophils Absolute 0 00 0 00 - 0 10 Thousand/uL    Total Counted 100     Anisocytosis Present     Poikilocytes Present     Platelet Estimate Adequate Adequate       Acute renal insufficiency noted as well as likely urinary tract infection as etiology of leukocytosis and lactic acidosis  The plan is for IV rehydration and antibiotic coverage with Levaquin  This plan was discussed with hospitalist who agree with plan for admission                        ED Course as of Nov 23 0118   Thu Nov 23, 2017   0028 I discussed the case with the hospitalist  We reviewed the HPI, pertinent PMH, ED course and workup  Hospitalist agreed with plan and will admit the patient to the hospital       Procedures  MDM  CritCare Time      Disposition  Final diagnoses:   UTI (urinary tract infection)   Leukocytosis   Acute renal insufficiency     Time reflects when diagnosis was documented in both MDM as applicable and the Disposition within this note     Time User Action Codes Description Comment    11/23/2017 12:16 AM Willis PARK Add [N39 0] UTI (urinary tract infection)     11/23/2017 12:16 AM Bevely Aschoff Add [D72 829] Leukocytosis     11/23/2017 12:16 AM Willis PARK Add [N28 9] Acute renal insufficiency       ED Disposition     ED Disposition Condition Comment    Admit  Case was discussed with Bob Loza and the patient's admission status was agreed to be Admission Status: inpatient status to the service of Dr Rangel Poor   Follow-up Information    None       Patient's Medications   Discharge Prescriptions    No medications on file     No discharge procedures on file         ED Provider  Electronically Signed by

## 2017-11-24 ENCOUNTER — ANESTHESIA EVENT (INPATIENT)
Dept: PERIOP | Facility: HOSPITAL | Age: 77
DRG: 698 | End: 2017-11-24
Payer: COMMERCIAL

## 2017-11-24 ENCOUNTER — APPOINTMENT (INPATIENT)
Dept: ULTRASOUND IMAGING | Facility: HOSPITAL | Age: 77
DRG: 698 | End: 2017-11-24
Payer: COMMERCIAL

## 2017-11-24 LAB
ANION GAP SERPL CALCULATED.3IONS-SCNC: 10 MMOL/L (ref 4–13)
BUN SERPL-MCNC: 42 MG/DL (ref 5–25)
CALCIUM SERPL-MCNC: 10 MG/DL (ref 8.3–10.1)
CHLORIDE SERPL-SCNC: 110 MMOL/L (ref 100–108)
CO2 SERPL-SCNC: 21 MMOL/L (ref 21–32)
CREAT SERPL-MCNC: 2.09 MG/DL (ref 0.6–1.3)
GFR SERPL CREATININE-BSD FRML MDRD: 22 ML/MIN/1.73SQ M
GLUCOSE SERPL-MCNC: 110 MG/DL (ref 65–140)
POTASSIUM SERPL-SCNC: 3.7 MMOL/L (ref 3.5–5.3)
SODIUM SERPL-SCNC: 141 MMOL/L (ref 136–145)

## 2017-11-24 PROCEDURE — 76770 US EXAM ABDO BACK WALL COMP: CPT

## 2017-11-24 PROCEDURE — 80048 BASIC METABOLIC PNL TOTAL CA: CPT | Performed by: NURSE PRACTITIONER

## 2017-11-24 RX ADMIN — ACETAMINOPHEN 650 MG: 325 TABLET ORAL at 12:40

## 2017-11-24 RX ADMIN — OXYBUTYNIN CHLORIDE 5 MG: 5 TABLET, EXTENDED RELEASE ORAL at 09:41

## 2017-11-24 RX ADMIN — LIDOCAINE 1 PATCH: 50 PATCH CUTANEOUS at 09:41

## 2017-11-24 RX ADMIN — VITAMIN D, TAB 1000IU (100/BT) 2000 UNITS: 25 TAB at 09:42

## 2017-11-24 RX ADMIN — OLANZAPINE 2.5 MG: 5 TABLET, FILM COATED ORAL at 09:40

## 2017-11-24 RX ADMIN — ACETAMINOPHEN 650 MG: 325 TABLET ORAL at 05:02

## 2017-11-24 RX ADMIN — SODIUM CHLORIDE 125 ML/HR: 0.9 INJECTION, SOLUTION INTRAVENOUS at 08:01

## 2017-11-24 RX ADMIN — GABAPENTIN 100 MG: 100 CAPSULE ORAL at 09:41

## 2017-11-24 RX ADMIN — DIAPER RASH SKIN PROTECTENT 1 APPLICATION: at 09:42

## 2017-11-24 RX ADMIN — ASPIRIN 81 MG: 81 TABLET, COATED ORAL at 09:40

## 2017-11-24 RX ADMIN — LEVOTHYROXINE SODIUM 25 MCG: 25 TABLET ORAL at 05:02

## 2017-11-24 RX ADMIN — SERTRALINE HYDROCHLORIDE 100 MG: 100 TABLET ORAL at 09:41

## 2017-11-24 RX ADMIN — Medication 1 SPRAY: at 09:41

## 2017-11-24 RX ADMIN — OLANZAPINE 2.5 MG: 5 TABLET, FILM COATED ORAL at 17:28

## 2017-11-24 RX ADMIN — DIAPER RASH SKIN PROTECTENT 1 APPLICATION: at 17:29

## 2017-11-24 RX ADMIN — ACETAMINOPHEN 650 MG: 325 TABLET ORAL at 23:03

## 2017-11-24 RX ADMIN — ACETAMINOPHEN 650 MG: 325 TABLET ORAL at 01:17

## 2017-11-24 RX ADMIN — MICONAZOLE NITRATE: 2 CREAM TOPICAL at 17:29

## 2017-11-24 RX ADMIN — MICONAZOLE NITRATE 1 APPLICATION: 2 CREAM TOPICAL at 09:42

## 2017-11-24 RX ADMIN — RIVAROXABAN 20 MG: 20 TABLET, FILM COATED ORAL at 09:41

## 2017-11-24 RX ADMIN — ACETAMINOPHEN 650 MG: 325 TABLET ORAL at 17:27

## 2017-11-24 RX ADMIN — GABAPENTIN 100 MG: 100 CAPSULE ORAL at 22:44

## 2017-11-24 RX ADMIN — MELATONIN TAB 3 MG 3 MG: 3 TAB at 22:44

## 2017-11-24 RX ADMIN — SODIUM CHLORIDE 125 ML/HR: 0.9 INJECTION, SOLUTION INTRAVENOUS at 19:56

## 2017-11-24 RX ADMIN — SODIUM CHLORIDE 500 MG: 9 INJECTION, SOLUTION INTRAVENOUS at 04:26

## 2017-11-24 NOTE — SOCIAL WORK
Confirmed that the patient is an MA bed hold at the [facility] at [facility]

## 2017-11-24 NOTE — CONSULTS
Consultation - Urology   Uriah Cardenas 68 y o  female MRN: [id number]  Unit/Bed#: E2 -01 Encounter: [id number] 11/24/2017           Assessment/Plan    4 cm staghorn stone left kidney, with stents  See old reports: At stent change in July, the old stent was totally embedded in the staghorn and could not be removed  A new stent was placed alongside it  That was changed October 2; The new stent was mildly encrusted also  Her comorbidities have made definitive stone removal too risky; Our plans are to change the stent every 8 weeks, to prevent encrustation  Recent bacteremia from UTI further complicates this;  I would like to change the stent while she's here after appropriate course of IV abx, possibly next week  History of Present Illness     Attending: Tasia Winchester, DO    Reason for Consult /     Medications  Meds/Allergies     sodium chloride 125 mL/hr Last Rate: 125 mL/hr (11/24/17 0801)     bisacodyl    influenza vaccine    magnesium hydroxide    ondansetron    simethicone  Prior to Admission Medications   Prescriptions Last Dose Informant Patient Reported? Taking?    Carboxymethylcellul-Glycerin (REFRESH OPTIVE) 0 5-0 9 % SOLN   Yes Yes   Sig: Apply 1 drop to eye 2 (two) times a day   Cholecalciferol (VITAMIN D3) 2000 units TABS   Yes Yes   Sig: Take 2,000 Units by mouth daily   Melatonin 3 MG CAPS   Yes Yes   Sig: Take 3 mg by mouth daily at bedtime   Menthol, Topical Analgesic, (BIOFREEZE) 4 % GEL   Yes Yes   Sig: Apply topically every 8 (eight) hours as needed For shoulder pain     Multiple Vitamin (MULTIVITAMIN) tablet   Yes Yes   Sig: Take 1 tablet by mouth daily   OLANZAPINE PO   Yes Yes   Sig: Take 2 5 mg by mouth 2 (two) times a day Take 2 5 mg in am and 5 mg at bedtime    OXYBUTYNIN CHLORIDE ER PO   Yes Yes   Sig: Take 5 mg by mouth daily   acetaminophen (TYLENOL) 325 mg tablet   Yes Yes   Sig: Take 650 mg by mouth every 4 (four) hours as needed for mild pain     aspirin (ECOTRIN LOW STRENGTH) 81 mg EC tablet   Yes Yes   Sig: Take 81 mg by mouth daily   bisacodyl (DULCOLAX) 10 mg suppository   Yes Yes   Sig: Insert 10 mg into the rectum daily as needed for constipation   ciprofloxacin (CIPRO) 500 mg tablet   Yes Yes   Sig: Take 500 mg by mouth every 12 (twelve) hours   fexofenadine (ALLEGRA) 60 MG tablet   Yes Yes   Sig: Take 60 mg by mouth 2 (two) times a day     gabapentin (NEURONTIN) 100 mg capsule   Yes Yes   Sig: Take 100 mg by mouth 3 (three) times a day   levothyroxine 25 mcg tablet   Yes Yes   Sig: Take 25 mcg by mouth daily   magnesium hydroxide (MILK OF MAGNESIA) 400 mg/5 mL oral suspension   Yes Yes   Sig: Take 30 mL by mouth daily as needed for constipation   metoprolol tartrate (LOPRESSOR) 25 mg tablet   Yes Yes   Sig: Take 25 mg by mouth every 12 (twelve) hours   miconazole (REMEDY ANTIFUNGAL) 2 % powder   Yes Yes   Sig: Apply 1 application topically 3 (three) times a day   rivaroxaban (XARELTO) 20 mg tablet   Yes Yes   Sig: Take 20 mg by mouth daily with breakfast   schraggers   Yes Yes   Sig: Apply 1 application topically 2 (two) times a day Apply to right and left buttocks for excoriation      sertraline (ZOLOFT) 100 mg tablet   Yes Yes   Sig: Take 100 mg by mouth daily   simethicone (MYLICON) 80 mg chewable tablet   Yes Yes   Sig: Chew 80 mg every 6 (six) hours as needed for flatulence   sodium chloride (OCEAN) 0 65 % nasal spray   Yes Yes   Si spray into each nostril daily   sodium phosphate (FLEET) enema   Yes Yes   Sig: Insert 1 enema into the rectum as needed follow package directions      Facility-Administered Medications: None       Current Facility-Administered Medications:     acetaminophen (TYLENOL) tablet 650 mg, 650 mg, Oral, Q6H [address], Cristine Alvarenga MD, 650 mg at 17 1240    aspirin (ECOTRIN LOW STRENGTH) EC tablet 81 mg, 81 mg, Oral, Daily, Cristine Alvarenga MD, 81 mg at 17 0940    bisacodyl (DULCOLAX) rectal suppository 10 mg, 10 mg, Rectal, Daily PRN, Debora Sanchez MD    cholecalciferol (VITAMIN D3) tablet 2,000 Units, 2,000 Units, Oral, Daily, Debora Sanchez MD, 2,000 Units at 11/24/17 0942    ertapenem Leanor Bethany) 500 mg in sodium chloride 0 9 % 50 mL IVPB, 500 mg, Intravenous, Q24H, Leticia Aldea, DO, Stopped at 11/24/17 0502    gabapentin (NEURONTIN) capsule 100 mg, 100 mg, Oral, TID, Debora Sanchez MD, 100 mg at 11/24/17 0941    influenza inactivated quadrivalent vaccine (FLULAVAL) IM injection 0 5 mL, 0 5 mL, Intramuscular, Prior to discharge, Debora Sanchez MD    levothyroxine tablet 25 mcg, 25 mcg, Oral, Early Morning, Debora Sanchez MD, 25 mcg at 11/24/17 0502    lidocaine (LIDODERM) 5 % patch 1 patch, 1 patch, Transdermal, Daily, Debora Sanchez MD, 1 patch at 11/24/17 0941    magnesium hydroxide (MILK OF MAGNESIA) 400 mg/5 mL oral suspension 30 mL, 30 mL, Oral, Daily PRN, Debora Sanchez MD    melatonin tablet 3 mg, 3 mg, Oral, HS, Debora Sanchez MD, 3 mg at 11/23/17 2147    miconazole 2 % cream, , Topical, BID, Debora Sanchez MD, 1 application at 62/48/13 0942    OLANZapine (ZyPREXA) tablet 2 5 mg, 2 5 mg, Oral, BID, Debora Sanchez MD, 2 5 mg at 11/24/17 0940    ondansetron (ZOFRAN) injection 4 mg, 4 mg, Intravenous, Q6H PRN, Debora Sanchez MD    oxybutynin (DITROPAN-XL) 24 hr tablet 5 mg, 5 mg, Oral, Daily, Debora Sanchez MD, 5 mg at 11/24/17 0941    rivaroxaban (XARELTO) tablet 20 mg, 20 mg, Oral, Daily With Breakfast, Debora Sanchez MD, 20 mg at 11/24/17 0941    schraggers paste 1 application, 1 application, Topical, BID, KATHIA Phipps, 1 application at 63/37/73 0942    sertraline (ZOLOFT) tablet 100 mg, 100 mg, Oral, Daily, Debora Sanchez MD, 100 mg at 11/24/17 0941    simethicone (MYLICON) chewable tablet 80 mg, 80 mg, Oral, Q6H PRN, Debora Sanchez MD    sodium chloride (OCEAN) 0 65 % nasal spray 1 spray, 1 spray, Each Nare, Daily, Debora Sanchez MD, 1 spray at 11/24/17 0941    sodium chloride 0 9 % bolus 1,000 mL, 1,000 mL, Intravenous, Once **FOLLOWED BY** [COMPLETED] sodium chloride 0 9 % bolus 1,000 mL, 1,000 mL, Intravenous, Once, Stopped at 11/23/17 1451 **FOLLOWED BY** [COMPLETED] sodium chloride 0 9 % bolus 1,000 mL, 1,000 mL, Intravenous, Once, Stopped at 11/23/17 1600 **FOLLOWED BY** [COMPLETED] sodium chloride 0 9 % bolus 1,000 mL, 1,000 mL, Intravenous, Once, KATHIA Ray, Stopped at 11/23/17 1618    sodium chloride 0 9 % infusion, 125 mL/hr, Intravenous, Continuous, Alida Knowles DO, Last Rate: 125 mL/hr at 11/24/17 0801, 125 mL/hr at 11/24/17 0801  Review of Systems  10 point review of systems negative except as noted in HPI  Allergies  Allergies   Allergen Reactions    Clindamycin Hives and Other (See Comments)     Hives in throat      Alendronate Other (See Comments)     unknown    Butorphanol Other (See Comments)     unknown    Cephalosporins Other (See Comments)     unknown    Epinephrine Other (See Comments)     unknown    Erythromycin Other (See Comments)     unknown    [place] Flavor Other (See Comments)     unknown    Omeprazole Other (See Comments)     unknown    Papaya Derivatives Other (See Comments)     unknown    Vancomycin Other (See Comments)     unknown     PMH  Past Medical History:   Diagnosis Date    Abnormality of gait and mobility     Anemia     Anxiety     Calculus of kidney     Candidiasis of female genitalia     Clostridium difficile infection     History of C-Diff    Constipation     [facility]     Dementia     Dementia with psychosis     Depression     Diabetes mellitus ([facility] )     No medications    Dry eye syndrome     Dysphagia     Dysuria     Food allergy     matias/papaya    GERD (gastroesophageal reflux disease)     Hand injury     site of mrsa infection- nurse at nursing home denies knowledge of this    Hematuria     Hemiplegia and hemiparesis ([facility] [address] )     left    Hyperlipidemia     Hypertension  Hypothyroid     Insomnia     Irregular heart beat     A-fib    Muscle weakness     left side    Nephrolithiasis     Neuropathy     Paroxysmal atrial fibrillation (HCC)     Polyneuropathy     Seasonal allergies     Seasonal allergies     Shortness of breath     Stroke (HCC)     left sided weakness    TIA (transient ischemic attack)     Ureteral stent retained     replacement today 10/3/2017    Urinary incontinence     UTI (urinary tract infection)     Wears dentures     full set    Wears glasses     Wheelchair bound      Past surgical history  Past Surgical History:   Procedure Laterality Date    CARPAL TUNNEL RELEASE Right     CYSTOSCOPY      HYSTERECTOMY      LITHOTRIPSY      TX CYSTOSCOPY,INSERT URETERAL STENT Left 7/14/2017    Procedure: CYSTOSCOPY, laser bladder and left urethal stone, bilateral retrograde pyelogram, left uteroscopy, and left stent placement;  Surgeon: Clay Galeano MD;  [facility];  Service: Urology    TX CYSTOSCOPY,INSERT URETERAL STENT Left 10/3/2017    Procedure: Boaz Hillman, EXCHANGE STENT URETERAL;  Surgeon: Clay Galeano MD;  [facility];  Service: Urology    SALPINGOOPHORECTOMY Bilateral     URETEROSCOPY Left 10/3/2017    Procedure: URETEROSCOPY;  Surgeon: Clay Galeano MD;  [facility];  Service: Urology     Social history  Social History   Substance Use Topics    Smoking status: Never Smoker    Smokeless tobacco: Never Used    Alcohol use No     ?  Physical Exam  General appearance: alert, appears stated age, cooperative, moderately obese and slowed mentation  Head: Normocephalic, without obvious abnormality, atraumatic  Neck: no adenopathy, no carotid bruit, no JVD, supple, symmetrical, trachea midline and thyroid not enlarged, symmetric, no tenderness/mass/nodules  Abdomen: soft, non-tender; bowel sounds normal; no masses,  no organomegaly    Consults    Review of Systems    Historical Information   Allergies   Allergen Reactions    Clindamycin Hives and Other (See Comments)     Hives in throat      Alendronate Other (See Comments)     unknown    Butorphanol Other (See Comments)     unknown    Cephalosporins Other (See Comments)     unknown    Epinephrine Other (See Comments)     unknown    Erythromycin Other (See Comments)     unknown    [place] Flavor Other (See Comments)     unknown    Omeprazole Other (See Comments)     unknown    Papaya Derivatives Other (See Comments)     unknown    Vancomycin Other (See Comments)     unknown     Past Medical History:   Diagnosis Date    Abnormality of gait and mobility     Anemia     Anxiety     Calculus of kidney     Candidiasis of female genitalia     Clostridium difficile infection     History of C-Diff    Constipation     [facility]     Dementia     Dementia with psychosis     Depression     Diabetes mellitus (HCC)     No medications    Dry eye syndrome     Dysphagia     Dysuria     Food allergy     matias/papaya    GERD (gastroesophageal reflux disease)     Hand injury     site of mrsa infection- nurse at nursing home denies knowledge of this    Hematuria     Hemiplegia and hemiparesis ([facility] )     left    Hyperlipidemia     Hypertension     Hypothyroid     Insomnia     Irregular heart beat     A-fib    Muscle weakness     left side    Nephrolithiasis     Neuropathy     Paroxysmal atrial fibrillation (HCC)     Polyneuropathy     Seasonal allergies     Seasonal allergies     Shortness of breath     [facility])     left sided weakness    TIA (transient ischemic attack)     Ureteral stent retained     replacement today 10/3/2017    Urinary incontinence     UTI (urinary tract infection)     Wears dentures     full set    Wears glasses     Wheelchair bound      Past Surgical History:   Procedure Laterality Date    CARPAL TUNNEL RELEASE Right     CYSTOSCOPY      HYSTERECTOMY      LITHOTRIPSY      TX CYSTOSCOPY,INSERT URETERAL STENT Left 7/14/2017    Procedure: CYSTOSCOPY, laser bladder and left urethal stone, bilateral retrograde pyelogram, left uteroscopy, and left stent placement;  Surgeon: Mery Nicholson MD;  [facility];  Service: Urology    MS CYSTOSCOPY,INSERT URETERAL STENT Left 10/3/2017    Procedure: Malika Herr STENT URETERAL;  Surgeon: Mery Nicholson MD;  [facility];  Service: Urology    SALPINGOOPHORECTOMY Bilateral     URETEROSCOPY Left 10/3/2017    Procedure: URETEROSCOPY;  Surgeon: Mery Nicholson MD;  [facility];  Service: Urology     Social History   History   Alcohol Use No     History   Drug Use No     History   Smoking Status    Never Smoker   Smokeless Tobacco    Never Used     Family History: non-contributory        Lab Results:   CBC: No results found for: WBC, HGB, HCT, MCV, PLT, ADJUSTEDWBC, MCH, MCHC, RDW, MPV, NRBC  CMP:   Lab Results   Component Value Date     11/24/2017     (H) 11/24/2017    CO2 21 11/24/2017    ANIONGAP 10 11/24/2017    BUN 42 (H) 11/24/2017    CREATININE 2 09 (H) 11/24/2017    GLUCOSE 110 11/24/2017    CALCIUM 10 0 11/24/2017    EGFR 22 11/24/2017       Imaging Studies: I have personally reviewed pertinent reports  [facility]    Addendum Date: 11/24/2017 Addendum:   Addendum: Please note a nonobstructing 18 mm right upper pole renal calculus is noted  Result Date: 11/24/2017  Narrative: RENAL ULTRASOUND INDICATION: Left stent placement  Evaluate for hydronephrosis  COMPARISON: None  TECHNIQUE:   Ultrasound of the retroperitoneum was performed with a curvilinear transducer utilizing volumetric sweeps and still imaging techniques  FINDINGS: KIDNEYS: Symmetric and normal size  Right kidney:  12 8 x 7 2 cm  Normal echogenicity and contour  No suspicious masses detected  18 mm right midpole renal cyst is noted  No hydronephrosis  No shadowing calculi  No perinephric fluid collections   Left kidney: 9 3 x 4 8 cm  Normal echogenicity and contour  No suspicious masses detected  No hydronephrosis  Staghorn left renal calculus is present  No perinephric fluid collections  URETERS: Nonvisualized  BLADDER: Normally distended  No focal thickening or mass lesions  Bilateral ureteral jets detected  Large amount of stent is identified within the bladder  Incidental note was made of possible choledocholithiasis  Impression: Staghorn left renal calculus, no hydronephrosis identified  Large amount of stent is identified within the bladder, consider follow-up plain films to assess stent positioning  Note was made of possible choledocholithiasis  Correlate with laboratory evidence of biliary obstruction and consider MRCP for further characterization  Workstation performed: [id number]       EKG, Pathology, and Other Studies: I have personally reviewed pertinent reports  Counseling / Coordination of Care  Total floor / unit time spent today 35 minutes  Greater than 50% of total time was spent with the patient and / or family counseling and / or coordination of care       Zohra Bishop MD

## 2017-11-24 NOTE — PROGRESS NOTES
Progress Note - Infectious Disease   Minor Garcia 68 y o  female MRN: [id number]  Unit/Bed#: ICU 01 Encounter: [id number]      Impression/Recommendations:    1  Severe sepsis, POA  Tachycardia, leukocytosis, complicated by hypotension and lactic acidosis  Likely secondary to #2 and #3  Tachycardia and leukocytosis improving  Lactic acidosis resolved  -  Antibiotics as below  - monitor WBC count and temperatures  - monitor hemodynamics  - follow-up blood cultures to help guide antibiotic management     2  Gram-negative bacteremia  Likely secondary to #3  Admission blood cultures are both positive for non lactose fermenting gram-negative rods  -  Antibiotics as below  - Follow-up blood culture results to help guide antibiotic management  -  Follow-up repeat blood culture sent on 11/23       3  Probable UTI/pyelonephritis  No urinalysis or urine culture sent on admission as patient was refusing  Urinalysis after antibiotics were given is still positive  Blood cultures now also positive  No imaging done due to elevated creatinine  Reviewed prior urine cultures  Urine culture from 09/15/2017 grew greater than 100,000 Proteus, > 100,000  ESBL Klebsiella, and  pansensitive E coli  Urine culture from 07/14/2017 grew ESBL Klebsiella and Proteus       - continue with ertapenem for now  - Follow-up urine culture sent yesterday after antibiotics were given  -  Follow-up blood cultures  - Follow-up renal ultrasound  - urology to evaluate      3  LALITA  In the setting of #1  Prior creatinine at [facility] in 11/17 was 0 7  Currently receiving IV fluids  - monitor creatinine closely  - will dose adjust antibiotics based on renal function       4  History of left staghorn calculus status post encrusted old ureteral stent and new ureteral stent placed in 10/17   - urology evaluation  - follow-up renal ultrasound     Antibiotics:   ertapenem D3    Subjective:   No fevers or chills  Hypotension improved    No vasopressors were required  Objective:  Vitals:  HR:  [] 100  Resp:  [12-30] 22  BP: ()/(47-78) 102/61  SpO2:  [94 %-97 %] 95 %  Temp (24hrs), Av 3 °F (36 3 °C), Min:96 5 °F (35 8 °C), Max:98 1 °F (36 7 °C)  Current: Temperature: (!) 97 3 °F (36 3 °C)    Physical Exam:   General:  No acute distress  Psychiatric:  Awake and alert  Pulmonary:  Normal respiratory excursion without accessory muscle use  Abdomen:  Soft, nontender,  Obese  Extremities:  No edema  Skin:  No rashes    Lab Results:  I have personally reviewed pertinent labs  Results from last 7 days  Lab Units 17  0458 17  0617  2058   SODIUM mmol/L 141 139 141   POTASSIUM mmol/L 3 7 4 2 3 9   CHLORIDE mmol/L 110* 106 105   CO2 mmol/L 21 21 25   ANION GAP mmol/L 10 12 11   BUN mg/dL 42* 40* 35*   CREATININE mg/dL 2 09* 2 34* 2 24*   EGFR ml/min/1 73sq m 22 19 21   GLUCOSE RANDOM mg/dL 110 126 120   CALCIUM mg/dL 10 0 9 7 10 6*       Results from last 7 days  Lab Units 17  0614 17  2058   WBC Thousand/uL 13 21* 17 58*   HEMOGLOBIN g/dL 10 2* 11 7   PLATELETS Thousands/uL 190 208       Results from last 7 days  Lab Units 17  2319   BLOOD CULTURE    Non lactose fermenting gram negative urvashi*   GRAM STAIN RESULT  Gram negative rods  Gram negative rods       Imaging Studies:   I have personally reviewed pertinent imaging study reports and images in PACS  EKG, Pathology, and Other Studies:   I have personally reviewed pertinent reports

## 2017-11-24 NOTE — PROGRESS NOTES
Progress Note - Critical Care   Cassie Roach 68 y o  female MRN: [id number]  Unit/Bed#: ICU 01 Encounter: [id number]    Assessment/Plan:  1  Severe sepsis with septic shock secondary to UTI with possible pyelonephritis  · Patient was fluid resuscitated and shock component has resolved  Continue monitor hemodynamics closely  Goal to maintain MAP >65  · Continue IV NS at 125mL/hr  · Patient with history of ESBL  Preliminary blood cultures positive for gram-negative rods  Repeat blood cultures are pending, as is urine culture  Id is following  Continue ertapenem per their recommendation  Renally dose  2  History of staghorn calculus with multiple stents placed  · Was following with Urology as an outpatient have stent changed every 3-4 months due to recurrent infections  Urology consult is pending  Patient is scheduled for renal ultrasound today  3  Lactic acidosis secondary to #1  · Resolved with fluid resuscitation  4  Acute kidney injury secondary to #1  · Creatinine is slightly improved this am   Urine output is difficult to quantify urine as patient is incontinent and has refused Celaya catheter  Continue IV fluid hydration as above  Continue to trend renal indices and monitor intake and outputs closely as possible  5  Paroxysmal atrial fibrillation  · Currently sinus rhythm  Continue Xarelto for anticoagulation  6  Hypothyroid   · Continue Synthroid  7  History of CVA  · Patient has residual left upper extremity paralysis and decreased strength in the left lower extremity  · PT/OT once more stable     _____________________________________________________________________    HPI/24hr events:   Afebrile  Some episodes of hypotension  No acute events overnight      Medications:    acetaminophen 650 mg Oral Q6H [address]   aspirin 81 mg Oral Daily   cholecalciferol 2,000 Units Oral Daily   ertapenem 500 mg Intravenous Q24H   gabapentin 100 mg Oral TID   levothyroxine 25 mcg Oral Early Morning   lidocaine 1 patch Transdermal Daily   melatonin 3 mg Oral HS   miconazole  Topical BID   OLANZapine 2 5 mg Oral BID   oxybutynin 5 mg Oral Daily   rivaroxaban 20 mg Oral Daily With Breakfast   schraggers 1 application Topical BID   sertraline 100 mg Oral Daily   sodium chloride 1 spray Each Nare Daily   sodium chloride 1,000 mL Intravenous Once         sodium chloride 125 mL/hr Last Rate: 125 mL/hr (11/23/17 2343)         Physical exam:  Vitals: Body mass index is 41 38 kg/m²  Blood pressure 103/61, pulse 96, temperature 97 5 °F (36 4 °C), temperature source Temporal, resp   rate 17, height 5' 6" (1 676 m), weight 116 kg (256 lb 6 3 oz), SpO2 96 %, not currently breastfeeding ,  Temp  Min: 97 3 °F (36 3 °C)  Max: 98 6 °F (37 °C)  IBW: 59 3 kg    SpO2: 96 %  SpO2 Activity: At Rest  O2 Device: None (Room air)      Intake/Output Summary (Last 24 hours) at 11/24/17 0610  Last data filed at 11/24/17 0600   Gross per 24 hour   Intake          7576 83 ml   Output              832 ml   Net          6744 83 ml       Invasive/non-invasive ventilation settings:   Respiratory    Lab Data (Last 4 hours)    None         O2/Vent Data (Last 4 hours)    None              Invasive Devices     Peripheral Intravenous Line            Peripheral IV 11/23/17 Left Antecubital 1 day    Peripheral IV 11/23/17 Right;Dorsal (posterior) Forearm less than 1 day          Drain            Ureteral Drain/Stent Left ureter 7 Fr  51 days                  Physical Exam:  Gen:  Awake, alert, no acute distress  HEENT:  Atraumatic, normocephalic, extraocular movements intact, pupils 4 mm equal and reactive, chronic left facial droop, oropharynx clear  Neck:  Supple, trachea midline, no JVD, no lymphadenopathy  Chest:  Clear to auscultation  Cor:  Single Y2/C8, 2/6 systolic murmur, no rubs or gallops, regular rate and rhythm  Abd:  Obese, soft, nontender, nondistended, bowel sounds normoactive  Ext:  No edema, no clubbing or cyanosis  Neuro:  Oriented x3, cranial nerves 2-12 grossly intact, left upper extremity flaccid and left lower extremity with 3/6 strength consistent with prior CVA  Skin:  Warm, dry, excoriations to bilateral groin, buttocks and posterior thighs      Diagnostic Data:  Lab: I have personally reviewed pertinent lab results  CBC:     Results from last 7 days  Lab Units 11/23/17  0614 11/22/17  2058   WBC Thousand/uL 13 21* 17 58*   HEMOGLOBIN g/dL 10 2* 11 7   HEMATOCRIT % 31 8* 36 3   PLATELETS Thousands/uL 190 208       CMP:     Results from last 7 days  Lab Units 11/24/17  0458 11/23/17  0614 11/22/17 2058   SODIUM mmol/L 141 139 141   POTASSIUM mmol/L 3 7 4 2 3 9   CHLORIDE mmol/L 110* 106 105   CO2 mmol/L 21 21 25   BUN mg/dL 42* 40* 35*   CREATININE mg/dL 2 09* 2 34* 2 24*   CALCIUM mg/dL 10 0 9 7 10 6*   GLUCOSE RANDOM mg/dL 110 126 120     PT/INR:   No results found for: PT, INR,   Magnesium:     Phosphorous:       Microbiology:    Results from last 7 days  Lab Units 11/22/17  2319   GRAM STAIN RESULT  Gram negative rods  Gram negative rods       Imaging:  Renal Ultrasound - Pending    Cardiac lab/EKG/telemetry/ECHO:   Sinus Rhythm with PACs    VTE Prophylaxis: Xarelto, SCDs    Code Status: Level 3 - DNAR and DNI    Melvern Hedger Spatzer, CRNP    Portions of the record may have been created with voice recognition software  Occasional wrong word or "sound a like" substitutions may have occurred due to the inherent limitations of voice recognition software  Read the chart carefully and recognize, using context, where substitutions have occurred

## 2017-11-25 LAB
BACTERIA UR CULT: ABNORMAL

## 2017-11-25 RX ADMIN — LIDOCAINE 1 PATCH: 50 PATCH CUTANEOUS at 09:18

## 2017-11-25 RX ADMIN — VITAMIN D, TAB 1000IU (100/BT) 2000 UNITS: 25 TAB at 09:08

## 2017-11-25 RX ADMIN — SERTRALINE HYDROCHLORIDE 100 MG: 100 TABLET ORAL at 09:08

## 2017-11-25 RX ADMIN — OXYBUTYNIN CHLORIDE 5 MG: 5 TABLET, EXTENDED RELEASE ORAL at 09:09

## 2017-11-25 RX ADMIN — OLANZAPINE 2.5 MG: 5 TABLET, FILM COATED ORAL at 09:15

## 2017-11-25 RX ADMIN — ACETAMINOPHEN 650 MG: 325 TABLET ORAL at 17:56

## 2017-11-25 RX ADMIN — RIVAROXABAN 20 MG: 20 TABLET, FILM COATED ORAL at 09:08

## 2017-11-25 RX ADMIN — ASPIRIN 81 MG: 81 TABLET, COATED ORAL at 09:08

## 2017-11-25 RX ADMIN — SODIUM CHLORIDE 125 ML/HR: 0.9 INJECTION, SOLUTION INTRAVENOUS at 03:31

## 2017-11-25 RX ADMIN — SODIUM CHLORIDE 125 ML/HR: 0.9 INJECTION, SOLUTION INTRAVENOUS at 21:15

## 2017-11-25 RX ADMIN — Medication 1 SPRAY: at 09:17

## 2017-11-25 RX ADMIN — MELATONIN TAB 3 MG 3 MG: 3 TAB at 22:27

## 2017-11-25 RX ADMIN — ACETAMINOPHEN 650 MG: 325 TABLET ORAL at 06:04

## 2017-11-25 RX ADMIN — SODIUM CHLORIDE 500 MG: 9 INJECTION, SOLUTION INTRAVENOUS at 03:35

## 2017-11-25 RX ADMIN — LEVOTHYROXINE SODIUM 25 MCG: 25 TABLET ORAL at 06:04

## 2017-11-25 RX ADMIN — GABAPENTIN 100 MG: 100 CAPSULE ORAL at 09:08

## 2017-11-25 RX ADMIN — SODIUM CHLORIDE 125 ML/HR: 0.9 INJECTION, SOLUTION INTRAVENOUS at 12:39

## 2017-11-25 RX ADMIN — ACETAMINOPHEN 650 MG: 325 TABLET ORAL at 12:39

## 2017-11-25 RX ADMIN — MICONAZOLE NITRATE: 2 CREAM TOPICAL at 17:57

## 2017-11-25 RX ADMIN — GABAPENTIN 100 MG: 100 CAPSULE ORAL at 17:56

## 2017-11-25 RX ADMIN — DIAPER RASH SKIN PROTECTENT 1 APPLICATION: at 09:17

## 2017-11-25 RX ADMIN — OLANZAPINE 2.5 MG: 5 TABLET, FILM COATED ORAL at 17:57

## 2017-11-25 RX ADMIN — DIAPER RASH SKIN PROTECTENT 1 APPLICATION: at 17:57

## 2017-11-25 RX ADMIN — MICONAZOLE NITRATE: 2 CREAM TOPICAL at 09:18

## 2017-11-25 NOTE — PROGRESS NOTES
[facility] Progress Note  Patient: Rosalia Cherry 68 y o  female   MRN: [id number]  PCP: Tristian Galan MD  Unit/Bed#: E2 -01 Encounter: [id number]  Date Of Visit: 11/25/17      Assessment/plan  Principal problem  1  Severe sepsis with septic shock secondary to UTI with possible pyelonephritis- Pt was in critical care  She was transferred out as she is improving  She is followed by ID who is continuing her on ertapenem  Repeat blood cultures positive for klebsiella in 1 bottle  Will need to await sensitives again  2  michelle- baseline is 0 7-1 0 Pt refuses hernández  Continue IVF  Hold nephrotoxic medications  Check bmp in am      Active problems  1  History of staghorn calculus with multiple stents placed- appreciate urology recommendations  Pt has a 4cm nephrolithiasis on the left side with stents  Old stent can not be removed  New stent has been placed  comorbidities are too great to remove stone  She has stent changes q 8weeks  2  Lactic acidosis secondary tosepsis- resolved with IVF    3  PAF- currently sinus rhythm  Continue xarelto  Pt on metoprolol as outpt  If bp remains stable can restart  4  Hypothyroid-Continue Synthroid    5  History of CVa- Patient has residual left upper extremity paralysis and decreased strength in the left lower extremity  PT/OT eval    dispo- called her son anna  No answer    Subjective:   Pt seen and examined  Pt is sitting up eating breakfast  She denies f/c no cp no sob no n/v/d no abd pain    Objective:     Vitals: Blood pressure 109/70, pulse 96, temperature (!) 96 5 °F (35 8 °C), temperature source Tympanic, resp  rate 18, height 5' 6" (1 676 m), weight 116 kg (256 lb 6 3 oz), SpO2 95 %, not currently breastfeeding  ,Body mass index is 41 38 kg/m²      Lab, Imaging and other studies:    Results from last 7 days  Lab Units 11/23/17  0614   WBC Thousand/uL 13 21*   HEMOGLOBIN g/dL 10 2*   HEMATOCRIT % 31 8*   PLATELETS Thousands/uL 190       Results from last 7 days  Lab Units 11/24/17  0458   SODIUM mmol/L 141   POTASSIUM mmol/L 3 7   CHLORIDE mmol/L 110*   CO2 mmol/L 21   BUN mg/dL 42*   CREATININE mg/dL 2 09*   CALCIUM mg/dL 10 0   GLUCOSE RANDOM mg/dL 110         Lab Results   Component Value Date    BLOODCX No Growth at 24 hrs  11/23/2017    Nancye Folds  group (A) 11/23/2017    BLOODCX Klebsiella-Enterobacter  group (A) 11/22/2017    BLOODCX Klebsiella-Enterobacter  group (A) 11/22/2017    URINECX 60,000-69,000 cfu/ml Enterococcus species (A) 11/23/2017    URINECX 9270-9814 cfu/ml Proteus species (A) 11/23/2017    URINECX 8131-4148 cfu/ml Klebsiella-Enterobacter  group (A) 11/23/2017         [facility] Kidney And Bladder    Addendum Date: 11/24/2017 Addendum:   Addendum: Please note a nonobstructing 18 mm right upper pole renal calculus is noted  Result Date: 11/24/2017  Narrative: RENAL ULTRASOUND INDICATION: Left stent placement  Evaluate for hydronephrosis  COMPARISON: None  TECHNIQUE:   Ultrasound of the retroperitoneum was performed with a curvilinear transducer utilizing volumetric sweeps and still imaging techniques  FINDINGS: KIDNEYS: Symmetric and normal size  Right kidney:  12 8 x 7 2 cm  Normal echogenicity and contour  No suspicious masses detected  18 mm right midpole renal cyst is noted  No hydronephrosis  No shadowing calculi  No perinephric fluid collections  Left kidney:  9 3 x 4 8 cm  Normal echogenicity and contour  No suspicious masses detected  No hydronephrosis  Staghorn left renal calculus is present  No perinephric fluid collections  URETERS: Nonvisualized  BLADDER: Normally distended  No focal thickening or mass lesions  Bilateral ureteral jets detected  Large amount of stent is identified within the bladder  Incidental note was made of possible choledocholithiasis  Impression: Staghorn left renal calculus, no hydronephrosis identified   Large amount of stent is identified within the bladder, consider follow-up plain films to assess stent positioning  Note was made of possible choledocholithiasis  Correlate with laboratory evidence of biliary obstruction and consider MRCP for further characterization  Workstation performed: [id number]         Physical exam:  Physical Exam  General appearance: alert, appears stated age and cooperative  Head: Normocephalic, without obvious abnormality, atraumatic  Eyes: conjunctivae/corneas clear  PERRL, EOM's intact  Fundi benign    Neck: no adenopathy, no carotid bruit, no JVD, supple, symmetrical, trachea midline and thyroid not enlarged, symmetric, no tenderness/mass/nodules  Lungs: clear to auscultation bilaterally  Heart: regular rate and rhythm, S1, S2 normal, no murmur, click, rub or gallop  Abdomen: soft, non-tender; bowel sounds normal; no masses,  no organomegaly  Extremities: extremities normal, atraumatic, no cyanosis or edema  Pulses: 2+ and symmetric  Skin: Skin color, texture, turgor normal  No rashes or lesions  Neurologic awake alert oriented x3       VTE Pharmacologic Prophylaxis: xarelto  VTE Mechanical Prophylaxis: sequential compression device    Counseling / Coordination of Care  Total floor / unit time spent today 20 minutes      Current Length of Stay: 2 day(s)    Current Patient Status: Inpatient       Code Status: Level 3 - DNAR and DNI

## 2017-11-25 NOTE — PLAN OF CARE
METABOLIC, FLUID AND ELECTROLYTES - ADULT     Electrolytes maintained within normal limits Progressing     Fluid balance maintained Progressing        Potential for Falls     Patient will remain free of falls Progressing        Prexisting or High Potential for Compromised Skin Integrity     Skin integrity is maintained or improved Progressing

## 2017-11-25 NOTE — PROGRESS NOTES
Progress Note - Infectious Disease   Fausto Staley 68 y o  female MRN: [id number]  Unit/Bed#: E2 -01 Encounter: [id number]         Impression/Recommendations:    1  Severe sepsis, POA   Tachycardia, leukocytosis, complicated by hypotension and lactic acidosis   Likely secondary to #2 and #3   Tachycardia and leukocytosis improving   Lactic acidosis resolved  -  Antibiotics as below  - monitor WBC count and temperatures  - monitor hemodynamics  - follow-up blood cultures to help guide antibiotic management      2  Klebsiella bacteremia  Likely secondary to #3  Will likely be ESBL based on prior culture data  -  Antibiotics as below  - Follow-up final blood culture results to help guide antibiotic management       3  Probable UTI/pyelonephritis   Urine culture after antibiotics were given is growing Enterococcus, Proteus, Klebsiella  Blood cultures are growing Klebsiella, which I suspect is the cause of severe sepsis   Reviewed prior urine cultures  Urine culture from 09/15/2017 grew greater than 100,000 Proteus, > 100,000  ESBL Klebsiella, and  pansensitive E coli  Urine culture from 07/14/2017 grew ESBL Klebsiella and Proteus       - continue with ertapenem  - Follow-up final urine culture results  -  Follow-up blood culture sensitivities  - Urology planning exchange of the stent during this hospitalization, possibly sometime next week       3  LALITA   In the setting of #1  Prior creatinine at [facility] in 11/17 was 0 7   Currently receiving IV fluids  Creatinine trending down  - monitor creatinine closely  - will dose adjust antibiotics based on renal function       4  History of left staghorn calculus status post encrusted old ureteral stent and new ureteral stent placed in 10/17  The old stent cannot be removed as it is severely encrusted  This will clearly put the patient at risk for bacterial colonization of the old stent, as well as risk for recurrent urinary tract infections      - Urology planning exchange of the new stent during this hospitalization, possibly sometime next week       Antibiotics:   ertapenem D4       Subjective:    Transferred to regular floor  No fevers or chills  No shortness of breath or cough  No abdominal pain  Objective:  Vitals:  HR:  [] 91  Resp:  [18] 18  BP: (109-135)/(69-79) 135/79  SpO2:  [95 %-96 %] 96 %  Temp (24hrs), Av 9 °F (36 1 °C), Min:96 5 °F (35 8 °C), Max:97 3 °F (36 3 °C)  Current: Temperature: (!) 97 °F (36 1 °C)    Physical Exam:   General:  No acute distress  Psychiatric:  Awake and alert  Pulmonary:  Normal respiratory excursion without accessory muscle use  Abdomen:  Soft, nontender,  Obese  Extremities:  No edema  Skin:  No rashes    Lab Results:  I have personally reviewed pertinent labs  Results from last 7 days  Lab Units 17  0458 17  0614 17  2058   SODIUM mmol/L 141 139 141   POTASSIUM mmol/L 3 7 4 2 3 9   CHLORIDE mmol/L 110* 106 105   CO2 mmol/L 21 21 25   ANION GAP mmol/L 10 12 11   BUN mg/dL 42* 40* 35*   CREATININE mg/dL 2 09* 2 34* 2 24*   EGFR ml/min/1 73sq m 22 19 21   GLUCOSE RANDOM mg/dL 110 126 120   CALCIUM mg/dL 10 0 9 7 10 6*       Results from last 7 days  Lab Units 17  0614 17  2058   WBC Thousand/uL 13 21* 17 58*   HEMOGLOBIN g/dL 10 2* 11 7   PLATELETS Thousands/uL 190 208       Results from last 7 days  Lab Units 17  1447 17  1348 17  1334 17  2319   BLOOD CULTURE   --  No Growth at 24 hrs  Klebsiella-Enterobacter  group*   Klebsiella-Enterobacter  group*  Klebsiella-Enterobacter  group*   GRAM STAIN RESULT   --   --  Gram negative rods Gram negative rods  Gram negative rods   URINE CULTURE  60,000-69,000 cfu/ml Enterococcus species*  3350-4906 cfu/ml Proteus species*  4250-3054 cfu/ml Klebsiella-Enterobacter  group*  --   --   --        Imaging Studies:   I have personally reviewed pertinent imaging study reports and images in PACS      Renal ultrasound showing left staghorn calculus with no hydronephrosis    EKG, Pathology, and Other Studies:   I have personally reviewed pertinent reports

## 2017-11-26 LAB
ANION GAP SERPL CALCULATED.3IONS-SCNC: 10 MMOL/L (ref 4–13)
BACTERIA BLD CULT: ABNORMAL
BACTERIA BLD CULT: ABNORMAL
BUN SERPL-MCNC: 37 MG/DL (ref 5–25)
CALCIUM SERPL-MCNC: 10 MG/DL (ref 8.3–10.1)
CHLORIDE SERPL-SCNC: 115 MMOL/L (ref 100–108)
CO2 SERPL-SCNC: 20 MMOL/L (ref 21–32)
CREAT SERPL-MCNC: 1.48 MG/DL (ref 0.6–1.3)
ERYTHROCYTE [DISTWIDTH] IN BLOOD BY AUTOMATED COUNT: 16.2 % (ref 11.6–15.1)
GFR SERPL CREATININE-BSD FRML MDRD: 34 ML/MIN/1.73SQ M
GLUCOSE SERPL-MCNC: 81 MG/DL (ref 65–140)
GRAM STN SPEC: ABNORMAL
GRAM STN SPEC: ABNORMAL
HCT VFR BLD AUTO: 27.5 % (ref 34.8–46.1)
HGB BLD-MCNC: 8.8 G/DL (ref 11.5–15.4)
MCH RBC QN AUTO: 27.8 PG (ref 26.8–34.3)
MCHC RBC AUTO-ENTMCNC: 32 G/DL (ref 31.4–37.4)
MCV RBC AUTO: 87 FL (ref 82–98)
PLATELET # BLD AUTO: 215 THOUSANDS/UL (ref 149–390)
PMV BLD AUTO: 10.1 FL (ref 8.9–12.7)
POTASSIUM SERPL-SCNC: 3.9 MMOL/L (ref 3.5–5.3)
RBC # BLD AUTO: 3.16 MILLION/UL (ref 3.81–5.12)
SODIUM SERPL-SCNC: 145 MMOL/L (ref 136–145)
WBC # BLD AUTO: 6.51 THOUSAND/UL (ref 4.31–10.16)

## 2017-11-26 PROCEDURE — 85027 COMPLETE CBC AUTOMATED: CPT | Performed by: INTERNAL MEDICINE

## 2017-11-26 PROCEDURE — 80048 BASIC METABOLIC PNL TOTAL CA: CPT | Performed by: INTERNAL MEDICINE

## 2017-11-26 RX ADMIN — DIAPER RASH SKIN PROTECTENT 1 APPLICATION: at 09:00

## 2017-11-26 RX ADMIN — SODIUM CHLORIDE 125 ML/HR: 0.9 INJECTION, SOLUTION INTRAVENOUS at 06:01

## 2017-11-26 RX ADMIN — GABAPENTIN 100 MG: 100 CAPSULE ORAL at 20:43

## 2017-11-26 RX ADMIN — MELATONIN TAB 3 MG 3 MG: 3 TAB at 23:00

## 2017-11-26 RX ADMIN — OLANZAPINE 2.5 MG: 5 TABLET, FILM COATED ORAL at 17:04

## 2017-11-26 RX ADMIN — OXYBUTYNIN CHLORIDE 5 MG: 5 TABLET, EXTENDED RELEASE ORAL at 08:56

## 2017-11-26 RX ADMIN — ASPIRIN 81 MG: 81 TABLET, COATED ORAL at 08:55

## 2017-11-26 RX ADMIN — GABAPENTIN 100 MG: 100 CAPSULE ORAL at 16:15

## 2017-11-26 RX ADMIN — ACETAMINOPHEN 650 MG: 325 TABLET ORAL at 12:31

## 2017-11-26 RX ADMIN — LEVOTHYROXINE SODIUM 25 MCG: 25 TABLET ORAL at 06:02

## 2017-11-26 RX ADMIN — SODIUM CHLORIDE 500 MG: 9 INJECTION, SOLUTION INTRAVENOUS at 03:59

## 2017-11-26 RX ADMIN — Medication 1 SPRAY: at 08:59

## 2017-11-26 RX ADMIN — ACETAMINOPHEN 650 MG: 325 TABLET ORAL at 17:04

## 2017-11-26 RX ADMIN — SODIUM CHLORIDE 125 ML/HR: 0.9 INJECTION, SOLUTION INTRAVENOUS at 14:27

## 2017-11-26 RX ADMIN — ACETAMINOPHEN 650 MG: 325 TABLET ORAL at 06:03

## 2017-11-26 RX ADMIN — MICONAZOLE NITRATE: 2 CREAM TOPICAL at 08:59

## 2017-11-26 RX ADMIN — RIVAROXABAN 20 MG: 20 TABLET, FILM COATED ORAL at 08:56

## 2017-11-26 RX ADMIN — METOPROLOL TARTRATE 12.5 MG: 25 TABLET ORAL at 20:40

## 2017-11-26 RX ADMIN — VITAMIN D, TAB 1000IU (100/BT) 2000 UNITS: 25 TAB at 08:56

## 2017-11-26 RX ADMIN — LIDOCAINE 1 PATCH: 50 PATCH CUTANEOUS at 08:56

## 2017-11-26 RX ADMIN — ACETAMINOPHEN 650 MG: 325 TABLET ORAL at 23:00

## 2017-11-26 RX ADMIN — OLANZAPINE 2.5 MG: 5 TABLET, FILM COATED ORAL at 08:57

## 2017-11-26 RX ADMIN — SERTRALINE HYDROCHLORIDE 100 MG: 100 TABLET ORAL at 08:56

## 2017-11-26 RX ADMIN — DIAPER RASH SKIN PROTECTENT 1 APPLICATION: at 17:04

## 2017-11-26 RX ADMIN — MICONAZOLE NITRATE: 2 CREAM TOPICAL at 17:04

## 2017-11-26 RX ADMIN — GABAPENTIN 100 MG: 100 CAPSULE ORAL at 08:56

## 2017-11-26 NOTE — PROGRESS NOTES
Tentatively plan for cysto, left stent exchange Tuesday (will be 6 days on IV Abx by then)  Discuss with pt tomorrow

## 2017-11-26 NOTE — PROGRESS NOTES
[facility] Progress Note  Patient: Jonh Owens 68 y o  female   MRN: [id number]  PCP: Clay Puentes MD  Unit/Bed#: E2 -01 Encounter: [id number]  Date Of Visit: 11/26/17      Assessment/plan  Principal problem  1  Severe sepsis with septic shock secondary to UTI with possible pyelonephritis- Pt was in critical care  She was transferred out as she is improving  She is followed by ID who is continuing her on ertapenem  Repeat blood cultures positive for klebsiella in 1 bottle  Still sensitve to ertapenem  Pt will need repeat blood cultures to see if it has cleared       2  michelle- baseline is 0 7-1- repeat creatinine is 1 4  Pt refuses hernández  Continue IVF but decrease rate  Can likely d/c IVF tomorrow  Hold nephrotoxic medications       Active problems  1  History of staghorn calculus with multiple stents placed- appreciate urology recommendations  Pt has a 4cm nephrolithiasis on the left side with stents  Old stent can not be removed  New stent has been placed  comorbidities are too great to remove stone  She has stent changes q 8weeks  Urology will possibly change stent on Tuesday       2  Lactic acidosis secondary tosepsis- resolved with IVF     3  PAF- currently sinus rhythm  Continue xarelto  Restarted metoprolol at lower dose due to recent hypotension  Adjust metoprolol as needed       4  Hypothyroid-Continue Synthroid     5  History of CVa- Patient has residual left upper extremity paralysis and decreased strength in the left lower extremity  PT/OT eval    6  Acute anemia- h/h dropped from 11 to 8 8  Could be multifactorial due to michelle (creatinine as high as 3) with dilution from IVF and blood draws along with possible hematuria  Will check retic count, heme occult, iron, vitamin b12, folate  Check h/h in am will hold xarelto due to possible hematuria  Will also hold asa  Pt refuses hernández  Urology following   Possible cysto on Tuesday       dispo- son at bedside and updated    Subjective: Pt seen and examined  Pt is still weaker today  She denies any f/c no cp no sob no n/v/d no abd pain  Objective:     Vitals: Blood pressure 135/81, pulse 99, temperature (!) 95 2 °F (35 1 °C), temperature source Tympanic, resp  rate 18, height 5' 6" (1 676 m), weight 116 kg (256 lb 6 3 oz), SpO2 94 %, not currently breastfeeding  ,Body mass index is 41 38 kg/m²  Lab, Imaging and other studies:    Results from last 7 days  Lab Units 11/26/17  0447   WBC Thousand/uL 6 51   HEMOGLOBIN g/dL 8 8*   HEMATOCRIT % 27 5*   PLATELETS Thousands/uL 215       Results from last 7 days  Lab Units 11/26/17  0447   SODIUM mmol/L 145   POTASSIUM mmol/L 3 9   CHLORIDE mmol/L 115*   CO2 mmol/L 20*   BUN mg/dL 37*   CREATININE mg/dL 1 48*   CALCIUM mg/dL 10 0   GLUCOSE RANDOM mg/dL 81         Lab Results   Component Value Date    BLOODCX Klebsiella pneumoniae ESBL (A) 11/23/2017    BLOODCX Klebsiella-Enterobacter  group (A) 11/22/2017    BLOODCX Klebsiella pneumoniae ESBL (A) 11/22/2017    URINECX 60,000-69,000 cfu/ml Enterococcus faecalis (A) 11/23/2017    URINECX 8980-7335 cfu/ml Proteus species (A) 11/23/2017    URINECX 6992-6601 cfu/ml Klebsiella-Enterobacter  group (A) 11/23/2017         [facility] Kidney And Bladder    Addendum Date: 11/24/2017 Addendum:   Addendum: Please note a nonobstructing 18 mm right upper pole renal calculus is noted  Result Date: 11/24/2017  Narrative: RENAL ULTRASOUND INDICATION: Left stent placement  Evaluate for hydronephrosis  COMPARISON: None  TECHNIQUE:   Ultrasound of the retroperitoneum was performed with a curvilinear transducer utilizing volumetric sweeps and still imaging techniques  FINDINGS: KIDNEYS: Symmetric and normal size  Right kidney:  12 8 x 7 2 cm  Normal echogenicity and contour  No suspicious masses detected  18 mm right midpole renal cyst is noted  No hydronephrosis  No shadowing calculi  No perinephric fluid collections  Left kidney:  9 3 x 4 8 cm   Normal echogenicity and contour  No suspicious masses detected  No hydronephrosis  Staghorn left renal calculus is present  No perinephric fluid collections  URETERS: Nonvisualized  BLADDER: Normally distended  No focal thickening or mass lesions  Bilateral ureteral jets detected  Large amount of stent is identified within the bladder  Incidental note was made of possible choledocholithiasis  Impression: Staghorn left renal calculus, no hydronephrosis identified  Large amount of stent is identified within the bladder, consider follow-up plain films to assess stent positioning  Note was made of possible choledocholithiasis  Correlate with laboratory evidence of biliary obstruction and consider MRCP for further characterization  Workstation performed: [id number]         Physical exam:  Physical Exam  General appearance: alert, appears stated age and cooperative  Head: Normocephalic, without obvious abnormality, atraumatic  Eyes: conjunctivae/corneas clear  PERRL, EOM's intact  Fundi benign    Neck: no adenopathy, no carotid bruit, no JVD, supple, symmetrical, trachea midline and thyroid not enlarged, symmetric, no tenderness/mass/nodules  Lungs: clear to auscultation bilaterally  Heart: regular rate and rhythm, S1, S2 normal, no murmur, click, rub or gallop  Abdomen: soft, non-tender; bowel sounds normal; no masses,  no organomegaly  Extremities: extremities normal, atraumatic, no cyanosis or edema  Pulses: 2+ and symmetric  Skin: Skin color, texture, turgor normal  No rashes or lesions  Neurologic: awake alert oriented x3      VTE Pharmacologic Prophylaxis: no ac due to hematuria  VTE Mechanical Prophylaxis: sequential compression device    Counseling / Coordination of Care  Total floor / unit time spent today 20 minutes     Current Length of Stay: 3 day(s)    Current Patient Status: Inpatient     Code Status: Level 3 - DNAR and DNI

## 2017-11-26 NOTE — PLAN OF CARE
Problem: Prexisting or High Potential for Compromised Skin Integrity  Goal: Skin integrity is maintained or improved  INTERVENTIONS:  - Identify patients at risk for skin breakdown  - Assess and monitor skin integrity  - Assess and monitor nutrition and hydration status  - Monitor labs (i e  albumin)  - Assess for incontinence   - Turn and reposition patient  - Assist with mobility/ambulation  - Relieve pressure over bony prominences  - Avoid friction and shearing  - Provide appropriate hygiene as needed including keeping skin clean and dry  - Evaluate need for skin moisturizer/barrier cream  - Collaborate with interdisciplinary team (i e  Nutrition, Rehabilitation, etc )   - Patient/family teaching   Outcome: Progressing      Problem: Potential for Falls  Goal: Patient will remain free of falls  INTERVENTIONS:  - Assess patient frequently for physical needs  -  Identify cognitive and physical deficits and behaviors that affect risk of falls    -  [place] fall precautions as indicated by assessment   - Educate patient/family on patient safety including physical limitations  - Instruct patient to call for assistance with activity based on assessment  - Modify environment to reduce risk of injury  - Consider OT/PT consult to assist with strengthening/mobility   Outcome: Progressing      Problem: METABOLIC, FLUID AND ELECTROLYTES - ADULT  Goal: Electrolytes maintained within normal limits  INTERVENTIONS:  - Monitor labs and assess patient for signs and symptoms of electrolyte imbalances  - Administer electrolyte replacement as ordered  - Monitor response to electrolyte replacements, including repeat lab results as appropriate  - Instruct patient on fluid and nutrition as appropriate   Outcome: Progressing    Goal: Fluid balance maintained  INTERVENTIONS:  - Monitor labs and assess for signs and symptoms of volume excess or deficit  - Monitor I/O and WT  - Instruct patient on fluid and nutrition as appropriate Outcome: Progressing      Problem: INFECTION - ADULT  Goal: Absence or prevention of progression during hospitalization  INTERVENTIONS:  - Assess and monitor for signs and symptoms of infection  - Monitor lab/diagnostic results  - Monitor all insertion sites, i e  indwelling lines, tubes, and drains  - Monitor endotracheal (as able) and nasal secretions for changes in amount and color  - [place] appropriate cooling/warming therapies per order  - Administer medications as ordered  - Instruct and encourage patient and family to use good hand hygiene technique  - Identify and instruct in appropriate isolation precautions for identified infection/condition   Outcome: Progressing    Goal: Absence of fever/infection during neutropenic period  INTERVENTIONS:  - Monitor WBC  - Implement neutropenic guidelines   Outcome: Progressing

## 2017-11-26 NOTE — PHYSICAL THERAPY NOTE
Physical Therapy Screen Note      Referral received for PT eval and tx  Chart check performed  [place] [facility]  Pt needs mechanical lift for out of bed mobilization and needs wheelchair for mobility  Pt requires assist w/ ADLs  Pt confirmed this previous status  Inpatient PT is not indicated due to pt's dependent baseline mobility status  Also inpatient PT will not affect discharge of return to SNF  Recommend NSG perform out of bed mobilization via mechanical lift  Notified Gigi Friedman of screening pt for PT services and recommendations  D/C PT      Ruben Olmstead , PT

## 2017-11-27 PROBLEM — N39.0 SEPSIS DUE TO URINARY TRACT INFECTION (HCC): Status: ACTIVE | Noted: 2017-11-23

## 2017-11-27 PROBLEM — I69.354 HEMIPARESIS AFFECTING LEFT SIDE AS LATE EFFECT OF CEREBROVASCULAR ACCIDENT (HCC): Status: ACTIVE | Noted: 2017-11-23

## 2017-11-27 LAB
ANION GAP SERPL CALCULATED.3IONS-SCNC: 10 MMOL/L (ref 4–13)
BACTERIA BLD CULT: ABNORMAL
BUN SERPL-MCNC: 33 MG/DL (ref 5–25)
CALCIUM SERPL-MCNC: 9.9 MG/DL (ref 8.3–10.1)
CHLORIDE SERPL-SCNC: 114 MMOL/L (ref 100–108)
CO2 SERPL-SCNC: 20 MMOL/L (ref 21–32)
CREAT SERPL-MCNC: 1.27 MG/DL (ref 0.6–1.3)
ERYTHROCYTE [DISTWIDTH] IN BLOOD BY AUTOMATED COUNT: 16.3 % (ref 11.6–15.1)
FOLATE SERPL-MCNC: 5.5 NG/ML (ref 3.1–17.5)
GFR SERPL CREATININE-BSD FRML MDRD: 41 ML/MIN/1.73SQ M
GLUCOSE SERPL-MCNC: 86 MG/DL (ref 65–140)
GRAM STN SPEC: ABNORMAL
HCT VFR BLD AUTO: 28.3 % (ref 34.8–46.1)
HGB BLD-MCNC: 9 G/DL (ref 11.5–15.4)
IRON SATN MFR SERPL: 12 %
IRON SERPL-MCNC: 16 UG/DL (ref 50–170)
MCH RBC QN AUTO: 27.6 PG (ref 26.8–34.3)
MCHC RBC AUTO-ENTMCNC: 31.8 G/DL (ref 31.4–37.4)
MCV RBC AUTO: 87 FL (ref 82–98)
PLATELET # BLD AUTO: 231 THOUSANDS/UL (ref 149–390)
PMV BLD AUTO: 10.1 FL (ref 8.9–12.7)
POTASSIUM SERPL-SCNC: 3.6 MMOL/L (ref 3.5–5.3)
RBC # BLD AUTO: 3.26 MILLION/UL (ref 3.81–5.12)
RETICS # AUTO: NORMAL 10*3/UL (ref 14097–95744)
RETICS # CALC: 0.46 % (ref 0.37–1.87)
SODIUM SERPL-SCNC: 144 MMOL/L (ref 136–145)
TIBC SERPL-MCNC: 131 UG/DL (ref 250–450)
VIT B12 SERPL-MCNC: 1331 PG/ML (ref 100–900)
WBC # BLD AUTO: 5.54 THOUSAND/UL (ref 4.31–10.16)

## 2017-11-27 PROCEDURE — 83540 ASSAY OF IRON: CPT | Performed by: INTERNAL MEDICINE

## 2017-11-27 PROCEDURE — 83550 IRON BINDING TEST: CPT | Performed by: INTERNAL MEDICINE

## 2017-11-27 PROCEDURE — 85045 AUTOMATED RETICULOCYTE COUNT: CPT | Performed by: INTERNAL MEDICINE

## 2017-11-27 PROCEDURE — 82607 VITAMIN B-12: CPT | Performed by: INTERNAL MEDICINE

## 2017-11-27 PROCEDURE — 85027 COMPLETE CBC AUTOMATED: CPT | Performed by: INTERNAL MEDICINE

## 2017-11-27 PROCEDURE — 82746 ASSAY OF FOLIC ACID SERUM: CPT | Performed by: INTERNAL MEDICINE

## 2017-11-27 PROCEDURE — 80048 BASIC METABOLIC PNL TOTAL CA: CPT | Performed by: INTERNAL MEDICINE

## 2017-11-27 RX ADMIN — VITAMIN D, TAB 1000IU (100/BT) 2000 UNITS: 25 TAB at 10:40

## 2017-11-27 RX ADMIN — SODIUM CHLORIDE 75 ML/HR: 0.9 INJECTION, SOLUTION INTRAVENOUS at 03:05

## 2017-11-27 RX ADMIN — DIAPER RASH SKIN PROTECTENT 1 APPLICATION: at 10:43

## 2017-11-27 RX ADMIN — SODIUM CHLORIDE 500 MG: 9 INJECTION, SOLUTION INTRAVENOUS at 04:28

## 2017-11-27 RX ADMIN — MICONAZOLE NITRATE: 2 CREAM TOPICAL at 10:42

## 2017-11-27 RX ADMIN — LIDOCAINE 1 PATCH: 50 PATCH CUTANEOUS at 10:42

## 2017-11-27 RX ADMIN — METOPROLOL TARTRATE 12.5 MG: 25 TABLET ORAL at 22:32

## 2017-11-27 RX ADMIN — SODIUM CHLORIDE 75 ML/HR: 0.9 INJECTION, SOLUTION INTRAVENOUS at 16:39

## 2017-11-27 RX ADMIN — MICONAZOLE NITRATE: 2 CREAM TOPICAL at 17:34

## 2017-11-27 RX ADMIN — METOPROLOL TARTRATE 12.5 MG: 25 TABLET ORAL at 10:40

## 2017-11-27 RX ADMIN — MELATONIN TAB 3 MG 3 MG: 3 TAB at 22:30

## 2017-11-27 RX ADMIN — OLANZAPINE 2.5 MG: 5 TABLET, FILM COATED ORAL at 10:40

## 2017-11-27 RX ADMIN — GABAPENTIN 100 MG: 100 CAPSULE ORAL at 10:40

## 2017-11-27 RX ADMIN — SERTRALINE HYDROCHLORIDE 100 MG: 100 TABLET ORAL at 10:40

## 2017-11-27 RX ADMIN — ACETAMINOPHEN 650 MG: 325 TABLET ORAL at 23:55

## 2017-11-27 RX ADMIN — LEVOTHYROXINE SODIUM 25 MCG: 25 TABLET ORAL at 05:36

## 2017-11-27 RX ADMIN — DIAPER RASH SKIN PROTECTENT 1 APPLICATION: at 17:34

## 2017-11-27 RX ADMIN — ACETAMINOPHEN 650 MG: 325 TABLET ORAL at 05:36

## 2017-11-27 RX ADMIN — OXYBUTYNIN CHLORIDE 5 MG: 5 TABLET, EXTENDED RELEASE ORAL at 10:40

## 2017-11-27 RX ADMIN — ACETAMINOPHEN 650 MG: 325 TABLET ORAL at 17:25

## 2017-11-27 RX ADMIN — OLANZAPINE 2.5 MG: 5 TABLET, FILM COATED ORAL at 17:25

## 2017-11-27 NOTE — PLAN OF CARE
Problem: DISCHARGE PLANNING - CARE MANAGEMENT  Goal: Discharge to post-acute care or home with appropriate resources  INTERVENTIONS:  - Conduct assessment to determine patient/family and health care team treatment goals, and need for post-acute services based on payer coverage, community resources, and patient preferences, and barriers to discharge  - Address psychosocial, clinical, and financial barriers to discharge as identified in assessment in conjunction with the patient/family and health care team  - Arrange appropriate level of post-acute services according to patients   needs and preference and payer coverage in collaboration with the physician and health care team  - Communicate with and update the patient/family, physician, and health care team regarding progress on the discharge plan  - Arrange appropriate transportation to post-acute venues  Outcome: Adequate for Discharge  No needs at present  Patient to discharge back to [facility] when medically ready  CM to follow as needed

## 2017-11-27 NOTE — PLAN OF CARE
DISCHARGE PLANNING - CARE MANAGEMENT     Discharge to post-acute care or home with appropriate resources Progressing        INFECTION - ADULT     Absence or prevention of progression during hospitalization Progressing     Absence of fever/infection during neutropenic period Progressing        METABOLIC, FLUID AND ELECTROLYTES - ADULT     Electrolytes maintained within normal limits Progressing     Fluid balance maintained Progressing        Potential for Falls     Patient will remain free of falls Progressing        Prexisting or High Potential for Compromised Skin Integrity     Skin integrity is maintained or improved Progressing

## 2017-11-27 NOTE — PLAN OF CARE
INFECTION - ADULT     Absence of fever/infection during neutropenic period Progressing        Potential for Falls     Patient will remain free of falls Progressing        Prexisting or High Potential for Compromised Skin Integrity     Skin integrity is maintained or improved Progressing

## 2017-11-27 NOTE — PROGRESS NOTES
[address] Internal Medicine Progress Note  Patient: Uriah Cardenas 68 y o  female   MRN: [id number]  PCP: Caitlin Camacho MD  Unit/Bed#: E2 -04 Encounter: [id number]  Date Of Visit: 11/27/17    Assessment and plan:    Principal Problem:    Sepsis due to urinary tract infection ([address] )- with blood cultures positive for Klebsiella pneumoniae ESBL  Currently on ertapenem day 5  She will need total of 14 days  Anticipate PICC line placement to facilitate home/nursing IV antibiotic infusion  Active Problems:    Complicated urinary tract infection- with staghorn renal calculus and indwelling stent  This is likely the source of her sepsis  She is to undergo cystoscopy and stent exchange tomorrow    Acute kidney injury (HCC)-due to sepsis, resolved    Hypothyroidism-continue levothyroxine daily    Dementia with psychosis- continue Zyprexa  Watch for delirium    PAF-Xarelto on hold for procedure tomorrow    Hemiparesis affecting left side as late effect of cerebrovascular accident [facility])- the patient is a resident at [facility]  VTE Pharmacologic Prophylaxis:   Pharmacologic: Xarelto on hold  Mechanical VTE Prophylaxis in Place: No    Discussions with Specialists or Other Care Team Provider:  Hospital course reviewed    Education and Discussions with Family / Patient: spoke with son Yeimy Stein and gave update    Time Spent for Care: 20 minutes  More than 50% of total time spent on counseling and coordination of care as described above  Current Length of Stay: 4 day(s)    Current Patient Status: Inpatient   Certification Statement: The patient will continue to require additional inpatient hospital stay due to esbl bacteremia    Discharge Plan: eventual return to snf    Code Status: Level 3 - DNAR and DNI      Subjective:   Patient has chronic left-sided hemiparesis from previous stroke  She anxious for her procedure tomorrow  No reported fevers overnight she denied any flank pain      Objective: Vitals:   Temp (24hrs), Av 9 °F (36 6 °C), Min:97 3 °F (36 3 °C), Max:98 7 °F (37 1 °C)    HR:  [] 90  Resp:  [16-18] 16  BP: (123-159)/(68-79) 123/68  SpO2:  [94 %-97 %] 94 %  Body mass index is 41 38 kg/m²  Input and Output Summary (last 24 hours): Intake/Output Summary (Last 24 hours) at 17 1651  Last data filed at 17 0701   Gross per 24 hour   Intake          1381 67 ml   Output             1718 ml   Net          -336 33 ml       Physical Exam:     Physical Exam    Additional Data:     Labs:      Results from last 7 days  Lab Units 17  0448  17  2058   WBC Thousand/uL 5 54  < > 17 58*   HEMOGLOBIN g/dL 9 0*  < > 11 7   HEMATOCRIT % 28 3*  < > 36 3   PLATELETS Thousands/uL 231  < > 208   LYMPHO PCT %  --   --  6*   MONO PCT MAN %  --   --  4   EOSINO PCT MANUAL %  --   --  0   < > = values in this interval not displayed  Results from last 7 days  Lab Units 17  0448   SODIUM mmol/L 144   POTASSIUM mmol/L 3 6   CHLORIDE mmol/L 114*   CO2 mmol/L 20*   BUN mg/dL 33*   CREATININE mg/dL 1 27   CALCIUM mg/dL 9 9   GLUCOSE RANDOM mg/dL 86           * I Have Reviewed All Lab Data Listed Above  * Additional Pertinent Lab Tests Reviewed:  All Labs Within Last 24 Hours Reviewed    Imaging:    Imaging Reports Reviewed Today Include:  Ultrasound of kidney ureter and bladder      Recent Cultures (last 7 days):       Results from last 7 days  Lab Units 17  1447 17  1348 17  1334 17  2319   BLOOD CULTURE   --    Klebsiella-Enterobacter  group*   Klebsiella pneumoniae ESBL*   Klebsiella-Enterobacter  group*  Klebsiella pneumoniae ESBL*   GRAM STAIN RESULT   --  Gram negative rods Gram negative rods Gram negative rods  Gram negative rods   URINE CULTURE  60,000-69,000 cfu/ml Enterococcus faecalis*  6243-8881 cfu/ml Proteus species*  3389-0062 cfu/ml Klebsiella-Enterobacter  group*  --   --   --        Last 24 Hours Medication List: acetaminophen 650 mg Oral Q6H [facility]   cholecalciferol 2,000 Units Oral Daily   [START ON 11/28/2017] ertapenem 1,000 mg Intravenous Q24H   gabapentin 100 mg Oral TID   levothyroxine 25 mcg Oral Early Morning   lidocaine 1 patch Transdermal Daily   melatonin 3 mg Oral HS   metoprolol tartrate 12 5 mg Oral Q12H EVELYN   miconazole  Topical BID   OLANZapine 2 5 mg Oral BID   oxybutynin 5 mg Oral Daily   schraggers 1 application Topical BID   sertraline 100 mg Oral Daily   sodium chloride 1 spray Each Nare Daily   sodium chloride 1,000 mL Intravenous Once        Today, Patient Was Seen By: Edgar Garcia MD    ** Please Note: Dragon 360 Dictation voice to text software may have been used in the creation of this document   **

## 2017-11-27 NOTE — ANESTHESIA PREPROCEDURE EVALUATION
Review of Systems/Medical History  Patient summary reviewed  Chart reviewed      Cardiovascular  Exercise tolerance: poor,  Hyperlipidemia, Hypertension controlled, Dysrhythmias (Hx PAF), atrial fibrillation,   Comment: Hx of paroxysmal A-Fib,  Pulmonary  Negative pulmonary ROS Shortness of breath, ,        GI/Hepatic    GERD well controlled,   Comment: Hx of Gerd not treated     Kidney stones,   Comment: Ureteral Stents pt does not know which side     Endo/Other  History of thyroid disease , hypothyroidism,   Comment: No DM meds listed   GYN  Negative gynecology ROS          Hematology  Negative hematology ROS Anemia ,     Musculoskeletal  Back pain , spinal stenosis,   Comment: Lumbar DDD      Neurology    Neuromuscular disease , TIA, CVA , residual symptoms, Visual impairment (dry eye)  Comment: Residual stroke effects on left arm Psychology   Anxiety, Depression , being treated for depression, Schizophrenia           Physical Exam    Airway    Mallampati score: II  TM Distance: >3 FB  Neck ROM: full     Dental   Comment: Capped teeth,     Cardiovascular  Rhythm: regular, Rate: normal, Cardiovascular exam normal    Pulmonary  Pulmonary exam normal Breath sounds clear to auscultation,     Other Findings        Anesthesia Plan  ASA Score- 4       Anesthesia Type- IV sedation with anesthesia and general with ASA Monitors  Additional Monitors:   Airway Plan:           Induction- intravenous  Informed Consent- Anesthetic plan and risks discussed with patient

## 2017-11-27 NOTE — PROGRESS NOTES
Progress Note - Infectious Disease   Roshan Goodman 68 y o  female MRN: [id number]  Unit/Bed#: E2 -01 Encounter: [id number]      Impression/Recommendations:    1  Severe sepsis, POA   Tachycardia, leukocytosis, complicated by hypotension and lactic acidosis   Likely secondary to #2 and #3   Lactic acidosis resolved  Remains afebrile  Leukocytosis resolved  -  Antibiotics as below  - monitor WBC count and temperatures  - monitor hemodynamics  - follow-up blood cultures to help guide antibiotic management      2  ESBL Klebsiella bacteremia   Likely secondary to #3      -  Antibiotics as below  - Follow-up final blood culture results to help guide antibiotic management       3  Probable UTI/pyelonephritis   Urine culture after antibiotics were given is growing Enterococcus, Proteus, Klebsiella   Blood cultures are growing ESBL Klebsiella, which I suspect is the cause of severe sepsis  Urine culture from 09/15/2017 grew greater than 100,000 Proteus, > 100,000  ESBL Klebsiella, and  pansensitive E coli  Urine culture from 07/14/2017 grew ESBL Klebsiella and Proteus  Now urine culture (post-ertapenem) positive for Enterococcus faecalis, Proteus, Klebsiella  I suspect that the enterococcus faecalis in the urine is more likely due to colonization as only the Klebsiella was present in the blood and patient has clinically improved      - continue with ertapenem but will increase dose to 1 g IV daily based on improved renal function  Will need 14 days   - Urology planning exchange of the stent during this hospitalization on Tuesday, 11/28       3  LALITA   In the setting of #1  Prior creatinine at [facility] in 11/17 was 0 7   Currently receiving IV fluids  Creatinine continues to improve  - monitor creatinine closely  - will dose adjust antibiotics based on renal function       4  History of left staghorn calculus status post encrusted old ureteral stent and new ureteral stent placed in 10/17   The old stent cannot be removed as it is severely encrusted  This will clearly put the patient at risk for bacterial colonization of the old stent, as well as risk for recurrent urinary tract infections      - Urology planning exchange of the new stent during this hospitalization on Tuesday,       Antibiotics:   ertapenem D5          Subjective:  Patient seen on AM rounds  Denies fevers, chills, or sweats  Denies nausea, vomiting, or diarrhea  Objective:  Vitals:  HR:  [] 103  Resp:  [16-18] 16  BP: (128-159)/(69-81) 128/69  SpO2:  [94 %-97 %] 97 %  Temp (24hrs), Av 7 °F (35 9 °C), Min:95 2 °F (35 1 °C), Max:97 6 °F (36 4 °C)  Current: Temperature: (!) 97 3 °F (36 3 °C)    Physical Exam:   General:  No acute distress  Psychiatric:  Awake and alert  Pulmonary:  Normal respiratory excursion without accessory muscle use  Abdomen:  Soft, nontender, obese  Extremities:  No edema  Skin:  No rashes    Lab Results:  I have personally reviewed pertinent labs      Results from last 7 days  Lab Units 17  0448 17  0447 17  0458   SODIUM mmol/L 144 145 141   POTASSIUM mmol/L 3 6 3 9 3 7   CHLORIDE mmol/L 114* 115* 110*   CO2 mmol/L 20* 20* 21   ANION GAP mmol/L 10 10 10   BUN mg/dL 33* 37* 42*   CREATININE mg/dL 1 27 1 48* 2 09*   EGFR ml/min/1 73sq m 41 34 22   GLUCOSE RANDOM mg/dL 86 81 110   CALCIUM mg/dL 9 9 10 0 10 0       Results from last 7 days  Lab Units 17  0448 17  0447 17  0614   WBC Thousand/uL 5 54 6 51 13 21*   HEMOGLOBIN g/dL 9 0* 8 8* 10 2*   PLATELETS Thousands/uL 231 215 190       Results from last 7 days  Lab Units 17  1447 17  1348 17  1334 17  2319   BLOOD CULTURE   --   --    Klebsiella pneumoniae ESBL*   Klebsiella-Enterobacter  group*  Klebsiella pneumoniae ESBL*   GRAM STAIN RESULT   --  Gram negative rods Gram negative rods Gram negative rods  Gram negative rods   URINE CULTURE  60,000-69,000 cfu/ml Enterococcus faecalis* 0199-5937 cfu/ml Proteus species*  5697-4085 cfu/ml Klebsiella-Enterobacter  group*  --   --   --        Imaging Studies:   I have personally reviewed pertinent imaging study reports and images in PACS  EKG, Pathology, and Other Studies:   I have personally reviewed pertinent reports

## 2017-11-28 ENCOUNTER — APPOINTMENT (INPATIENT)
Dept: RADIOLOGY | Facility: HOSPITAL | Age: 77
DRG: 698 | End: 2017-11-28
Payer: COMMERCIAL

## 2017-11-28 ENCOUNTER — ANESTHESIA (INPATIENT)
Dept: PERIOP | Facility: HOSPITAL | Age: 77
DRG: 698 | End: 2017-11-28
Payer: COMMERCIAL

## 2017-11-28 LAB
BACTERIA BLD CULT: ABNORMAL
GRAM STN SPEC: ABNORMAL

## 2017-11-28 PROCEDURE — BT1FZZZ FLUOROSCOPY OF LEFT KIDNEY, URETER AND BLADDER: ICD-10-PCS | Performed by: UROLOGY

## 2017-11-28 PROCEDURE — 87147 CULTURE TYPE IMMUNOLOGIC: CPT | Performed by: UROLOGY

## 2017-11-28 PROCEDURE — 0T778DZ DILATION OF LEFT URETER WITH INTRALUMINAL DEVICE, VIA NATURAL OR ARTIFICIAL OPENING ENDOSCOPIC: ICD-10-PCS | Performed by: UROLOGY

## 2017-11-28 PROCEDURE — C1769 GUIDE WIRE: HCPCS | Performed by: UROLOGY

## 2017-11-28 PROCEDURE — 87077 CULTURE AEROBIC IDENTIFY: CPT | Performed by: UROLOGY

## 2017-11-28 PROCEDURE — C2617 STENT, NON-COR, TEM W/O DEL: HCPCS | Performed by: UROLOGY

## 2017-11-28 PROCEDURE — 76000 FLUOROSCOPY <1 HR PHYS/QHP: CPT

## 2017-11-28 PROCEDURE — 87186 SC STD MICRODIL/AGAR DIL: CPT | Performed by: UROLOGY

## 2017-11-28 PROCEDURE — 87086 URINE CULTURE/COLONY COUNT: CPT | Performed by: UROLOGY

## 2017-11-28 DEVICE — VARIABLE LENGTH INJECTION STENT SET
Type: IMPLANTABLE DEVICE | Site: URETER | Status: FUNCTIONAL
Brand: CONTOUR VL™ INJECTION STENT SET

## 2017-11-28 RX ORDER — PROPOFOL 10 MG/ML
INJECTION, EMULSION INTRAVENOUS AS NEEDED
Status: DISCONTINUED | OUTPATIENT
Start: 2017-11-28 | End: 2017-11-28 | Stop reason: SURG

## 2017-11-28 RX ORDER — MIDAZOLAM HYDROCHLORIDE 1 MG/ML
INJECTION INTRAMUSCULAR; INTRAVENOUS AS NEEDED
Status: DISCONTINUED | OUTPATIENT
Start: 2017-11-28 | End: 2017-11-28 | Stop reason: SURG

## 2017-11-28 RX ORDER — FENTANYL CITRATE/PF 50 MCG/ML
50 SYRINGE (ML) INJECTION
Status: DISCONTINUED | OUTPATIENT
Start: 2017-11-28 | End: 2017-11-28 | Stop reason: HOSPADM

## 2017-11-28 RX ORDER — ONDANSETRON 2 MG/ML
INJECTION INTRAMUSCULAR; INTRAVENOUS AS NEEDED
Status: DISCONTINUED | OUTPATIENT
Start: 2017-11-28 | End: 2017-11-28 | Stop reason: SURG

## 2017-11-28 RX ORDER — ONDANSETRON 2 MG/ML
4 INJECTION INTRAMUSCULAR; INTRAVENOUS ONCE AS NEEDED
Status: DISCONTINUED | OUTPATIENT
Start: 2017-11-28 | End: 2017-11-28 | Stop reason: HOSPADM

## 2017-11-28 RX ADMIN — SODIUM CHLORIDE 75 ML/HR: 0.9 INJECTION, SOLUTION INTRAVENOUS at 04:35

## 2017-11-28 RX ADMIN — MICONAZOLE NITRATE: 2 CREAM TOPICAL at 17:24

## 2017-11-28 RX ADMIN — SODIUM CHLORIDE 1000 MG: 9 INJECTION, SOLUTION INTRAVENOUS at 09:02

## 2017-11-28 RX ADMIN — ACETAMINOPHEN 650 MG: 325 TABLET ORAL at 23:48

## 2017-11-28 RX ADMIN — SODIUM CHLORIDE 1000 MG: 9 INJECTION, SOLUTION INTRAVENOUS at 04:03

## 2017-11-28 RX ADMIN — MIDAZOLAM HYDROCHLORIDE 2 MG: 1 INJECTION, SOLUTION INTRAMUSCULAR; INTRAVENOUS at 09:02

## 2017-11-28 RX ADMIN — DIAPER RASH SKIN PROTECTENT 1 APPLICATION: at 17:24

## 2017-11-28 RX ADMIN — LIDOCAINE HYDROCHLORIDE 40 MG: 20 INJECTION, SOLUTION INTRAVENOUS at 09:06

## 2017-11-28 RX ADMIN — ACETAMINOPHEN 650 MG: 325 TABLET ORAL at 12:43

## 2017-11-28 RX ADMIN — ACETAMINOPHEN 650 MG: 325 TABLET ORAL at 17:21

## 2017-11-28 RX ADMIN — GABAPENTIN 100 MG: 100 CAPSULE ORAL at 17:21

## 2017-11-28 RX ADMIN — OLANZAPINE 2.5 MG: 5 TABLET, FILM COATED ORAL at 17:21

## 2017-11-28 RX ADMIN — MELATONIN TAB 3 MG 3 MG: 3 TAB at 22:40

## 2017-11-28 RX ADMIN — ONDANSETRON HYDROCHLORIDE 4 MG: 2 INJECTION, SOLUTION INTRAVENOUS at 09:52

## 2017-11-28 RX ADMIN — GABAPENTIN 100 MG: 100 CAPSULE ORAL at 20:38

## 2017-11-28 RX ADMIN — PROPOFOL 80 MG: 10 INJECTION, EMULSION INTRAVENOUS at 09:06

## 2017-11-28 RX ADMIN — SODIUM CHLORIDE 75 ML/HR: 0.9 INJECTION, SOLUTION INTRAVENOUS at 15:05

## 2017-11-28 RX ADMIN — METOPROLOL TARTRATE 12.5 MG: 25 TABLET ORAL at 20:38

## 2017-11-28 NOTE — OP NOTE
OPERATIVE REPORT  PATIENT NAME: Jorge Wilkes    :  1940  MRN: [id number]  Pt Location: [facility] OR ROOM 08    SURGERY DATE: 2017    Surgeon(s) and Role:     * Chi Guidry MD - Primary    Preop Diagnosis:  Calculus of kidney [N20 0]    Post-Op Diagnosis Codes:     * Calculus of kidney [N20 0]    Procedure(s) (LRB):  EXCHANGE STENT URETERAL (Left)  CYSTOSCOPY WITH RETROGRADE PYELOGRAM (Left)    Specimen(s):  ID Type Source Tests Collected by Time Destination   A : Urine culture Urine Urine, Cystoscopic URINE CULTURE Chi Guidry MD 2017 2214        Estimated Blood Loss:   Minimal    Drains:       Anesthesia Type:   General    Operative Indications:  Calculus of kidney [N20 0]  Large staghorn stone with encrusted stent  See consult report for further details    Operative Findings:  New stent was only mildly encrusted, kidney drains well minimal hydro    Complications:   None    Procedure and Technique:      After the patient was placed under adequate anesthesia, she was prepped and draped using betadine solution in lithotomy position  The 25 Greek scope was passed thru the urethra, which showed no strictures  The bladder had mild chronic inflammation from the prior stent, but no lesions or tumors  The prior left stent was grasped, brought out thru the meatus, finding it was moderately encrusted on the distal portion  I extracted the stent out the meatus, attempted to wire it, but the proximal portion of the stent was occluded with stone  Therefore I passed a wire alongside the stent and that went up to the kidney  It should be noted that all this time, the old stent which is encrusted in the staghorn, remained in place, but the wire went up and down easily  A new seven [employer] Rachael contour injection stent was passed over the wire up to the renal pelvis  Contrast was injected, retrograde pyelogram showing good position and no hydronephrosis  The scope was removed, leaving the stent in place  She was taken to  in good condition       I was present for the entire procedure    Patient Disposition:  PACU     SIGNATURE: Roland Bradshaw MD  DATE: November 28, 2017  TIME: 10:01 AM

## 2017-11-28 NOTE — PROGRESS NOTES
[address] Internal Medicine Progress Note  Patient: David Briggs 68 y o  female   MRN: [id number]  PCP: Olaf Mishra MD  Unit/Bed#: E2 -71 Encounter: [id number]  Date Of Visit: 11/28/17    Assessment and plan:    Principal Problem:    Sepsis due to urinary tract infection ([address] )- day 6 of 14 of ertapenem  I tried to discuss PICC line placement/consent with the patient and his son Jose Gallagher at the bedside  Active Problems:    Complicated urinary tract infection-secondary to staghorn calculus with retained/encrusted old stent  Continue ertapenem for 14 days total    Staghorn renal calculus-status post cystoscopy with left ureteral stent  Unable to removal stent since it was  deeply imbedded within the stone    Acute kidney injury (HCC)-much improved  Creatinine is at baseline  Hemiparesis affecting left side as late effect of cerebrovascular accident (HCC)-the patient resides at top to [facility]  She has chronic left hemiparesis  Eventual return to [facility] once medically stable    PAF- Xarelto on hold due to hematuria and postop state  Resume once hematuria resolves    Hypothyroidism-continue levothyroxine daily        VTE Pharmacologic Prophylaxis:   Pharmacologic: None due to hematuria  Mechanical VTE Prophylaxis in Place: No    Discussions with Specialists or Other Care Team Provider:  Op note reviewed    Education and Discussions with Family / Patient:  Spoke with son at bedside    Time Spent for Care: 20 minutes  More than 50% of total time spent on counseling and coordination of care as described above  Current Length of Stay: 5 day(s)    Current Patient Status: Inpatient   Certification Statement: The patient will continue to require additional inpatient hospital stay due to Klebsiella sepsis    Discharge Plan:  Return to talk to [facility] unstable    Code Status: Level 3 - DNAR and DNI      Subjective:   Patient is reluctant to have the PICC line placed    She is unsure of the Ashley Haji capability of taking care of the PICC line  Discussed need for more reliable IV if she is to complete 14 days of antibiotic  Objective:     Vitals:   Temp (24hrs), Av °F (36 7 °C), Min:97 7 °F (36 5 °C), Max:98 7 °F (37 1 °C)    HR:  [] 76  Resp:  [9-19] 16  BP: (142-172)/(72-94) 160/87  SpO2:  [94 %-100 %] 96 %  Body mass index is 41 38 kg/m²  Input and Output Summary (last 24 hours): Intake/Output Summary (Last 24 hours) at 17 1644  Last data filed at 17 1505   Gross per 24 hour   Intake             2475 ml   Output              588 ml   Net             1887 ml       Physical Exam:     Physical Exam   Constitutional: She appears well-developed  No distress  HENT:   Mouth/Throat: No oropharyngeal exudate  Eyes: No scleral icterus  Cardiovascular: Regular rhythm  No murmur heard  Pulmonary/Chest: Effort normal  No stridor  No respiratory distress  She has no wheezes  Abdominal: Soft  Genitourinary:   Genitourinary Comments: Celaya catheter with bloody urine output   Musculoskeletal: She exhibits no edema  Neurological:   Chronic left hemiparesis   Skin: Skin is warm  She is not diaphoretic  Psychiatric: She has a normal mood and affect  Additional Data:     Labs:      Results from last 7 days  Lab Units 17  0448  17  2058   WBC Thousand/uL 5 54  < > 17 58*   HEMOGLOBIN g/dL 9 0*  < > 11 7   HEMATOCRIT % 28 3*  < > 36 3   PLATELETS Thousands/uL 231  < > 208   LYMPHO PCT %  --   --  6*   MONO PCT MAN %  --   --  4   EOSINO PCT MANUAL %  --   --  0   < > = values in this interval not displayed  Results from last 7 days  Lab Units 17  0448   SODIUM mmol/L 144   POTASSIUM mmol/L 3 6   CHLORIDE mmol/L 114*   CO2 mmol/L 20*   BUN mg/dL 33*   CREATININE mg/dL 1 27   CALCIUM mg/dL 9 9   GLUCOSE RANDOM mg/dL 86           * I Have Reviewed All Lab Data Listed Above  * Additional Pertinent Lab Tests Reviewed:  All Labs Within Last 24 Hours Reviewed    Imaging:    Imaging Reports Reviewed Today Include:  Operative report      Recent Cultures (last 7 days):       Results from last 7 days  Lab Units 11/23/17  1447 11/23/17  1348 11/23/17  1334 11/22/17  2319   BLOOD CULTURE   --    Klebsiella-Enterobacter  group*   Klebsiella pneumoniae ESBL*   Klebsiella-Enterobacter  group*  Klebsiella pneumoniae ESBL*   GRAM STAIN RESULT   --  Gram negative rods Gram negative rods Gram negative rods  Gram negative rods   URINE CULTURE  60,000-69,000 cfu/ml Enterococcus faecalis*  6848-7975 cfu/ml Proteus species*  7202-5372 cfu/ml Klebsiella-Enterobacter  group*  --   --   --        Last 24 Hours Medication List:     acetaminophen 650 mg Oral Q6H [address]   cholecalciferol 2,000 Units Oral Daily   ertapenem 1,000 mg Intravenous Q24H   gabapentin 100 mg Oral TID   levothyroxine 25 mcg Oral Early Morning   lidocaine 1 patch Transdermal Daily   melatonin 3 mg Oral HS   metoprolol tartrate 12 5 mg Oral Q12H EVELYN   miconazole  Topical BID   OLANZapine 2 5 mg Oral BID   oxybutynin 5 mg Oral Daily   schraggers 1 application Topical BID   sertraline 100 mg Oral Daily   sodium chloride 1 spray Each Nare Daily   sodium chloride 1,000 mL Intravenous Once        Today, Patient Was Seen By: Ayo Mcnair MD    ** Please Note: Dragon 360 Dictation voice to text software may have been used in the creation of this document   **

## 2017-11-28 NOTE — ANESTHESIA POSTPROCEDURE EVALUATION
Post-Op Assessment Note      CV Status:  Stable    Mental Status:  Alert and awake    Hydration Status:  Euvolemic    PONV Controlled:  Controlled    Airway Patency:  Patent    Post Op Vitals Reviewed:  Yes              /77 (11/28/17 1037)    Temp 98 7 °F (37 1 °C) (11/28/17 1037)    Pulse 95 (11/28/17 1037)   Resp 16 (11/28/17 1037)    SpO2 94 % (11/28/17 1037)

## 2017-11-28 NOTE — PROGRESS NOTES
Periods of confusion; pt initially insisting she needed to be NPO for 24 hours prior to surgery, then stating it wasn't necessary to be NPO at all  At 11 p m  Pt thought staff was "lying" to her and that it was really 11 a m  Argumentative with reorientation attempts  Son, Butler Lanes was contacted and spoke with pt by phone  She seemed more content and more oriented after this and was no longer argumentative

## 2017-11-28 NOTE — CASE MANAGEMENT
Continued Stay Review    Date:     11/28/2017    Vital Signs: /87   Pulse 76   Temp 97 9 °F (36 6 °C) (Tympanic)   Resp 16   Ht 5' 6" (1 676 m)   Wt 116 kg (256 lb 6 3 oz)   SpO2 96%   BMI 41 38 kg/m²     Medications:   Scheduled Meds:   acetaminophen 650 mg Oral Q6H [address]   cholecalciferol 2,000 Units Oral Daily   ertapenem 1,000 mg Intravenous Q24H   gabapentin 100 mg Oral TID   levothyroxine 25 mcg Oral Early Morning   lidocaine 1 patch Transdermal Daily   melatonin 3 mg Oral HS   metoprolol tartrate 12 5 mg Oral Q12H [address]   miconazole  Topical BID   OLANZapine 2 5 mg Oral BID   oxybutynin 5 mg Oral Daily   schraggers 1 application Topical BID   sertraline 100 mg Oral Daily   sodium chloride 1 spray Each Nare Daily   sodium chloride 1,000 mL Intravenous Once     Continuous Infusions:   sodium chloride 75 mL/hr Last Rate: 75 mL/hr (11/28/17 1505)     PRN Meds: bisacodyl    influenza vaccine    magnesium hydroxide    ondansetron    simethicone    Abnormal Labs/Diagnostic Results:   H/H    9/28 3     (  11/27)    Age/Sex: 68 y o  female     Assessment/Plan:   SURGERY DATE: 11/28/2017     Surgeon(s) and Role:     * Danny Miller MD - Primary     Preop Diagnosis:  Calculus of kidney [N20 0]     Post-Op Diagnosis Codes:     * Calculus of kidney [N20 0]     Procedure(s) (LRB):  EXCHANGE STENT URETERAL (Left)  CYSTOSCOPY WITH RETROGRADE PYELOGRAM (left)      PROGRESS  NOTE  11/27  Sepsis due to urinary tract infection ([address] )- with blood cultures positive for Klebsiella pneumoniae ESBL  Currently on ertapenem day 5  She will need total of 14 days  Anticipate PICC line placement to facilitate home/nursing IV antibiotic infusion  Active Problems:    Complicated urinary tract infection- with staghorn renal calculus and indwelling stent  This is likely the source of her sepsis    She is to undergo cystoscopy and stent exchange tomorrow    Acute kidney injury (HCC)-due to sepsis, resolved Hypothyroidism-continue levothyroxine daily    Dementia with psychosis- continue Zyprexa  Watch for delirium    PAF-Xarelto on hold for procedure tomorrow    Hemiparesis affecting left side as late effect of cerebrovascular accident [facility])- the patient is a resident at [facility]         Discharge Plan:     Return to  [facility]    [address] [facility] in the [facility] by Chandan Ford for 2017  Network Utilization Review Department  Phone: [phone]; Fax [phone]  ATTENTION: The Network Utilization Review Department is now centralized for our 7 Facilities  Make a note that we have a new phone and fax numbers for our Department  Please call with any questions or concerns to [phone] and carefully follow the prompts so that you are directed to the right person  All voicemails are confidential  Fax any determinations, approvals, denials, and requests for initial or continue stay review clinical to [phone]  Due to HIGH CALL volume, it would be easier if you could please send faxed requests to expedite your requests and in part, help us provide discharge notifications faster

## 2017-11-29 ENCOUNTER — GENERIC CONVERSION - ENCOUNTER (OUTPATIENT)
Dept: OTHER | Facility: OTHER | Age: 77
End: 2017-11-29

## 2017-11-29 ENCOUNTER — APPOINTMENT (INPATIENT)
Dept: RADIOLOGY | Facility: HOSPITAL | Age: 77
DRG: 698 | End: 2017-11-29
Payer: COMMERCIAL

## 2017-11-29 PROCEDURE — 71010 HB CHEST X-RAY 1 VIEW FRONTAL: CPT

## 2017-11-29 PROCEDURE — 05HB33Z INSERTION OF INFUSION DEVICE INTO RIGHT BASILIC VEIN, PERCUTANEOUS APPROACH: ICD-10-PCS | Performed by: INTERNAL MEDICINE

## 2017-11-29 PROCEDURE — 36569 INSJ PICC 5 YR+ W/O IMAGING: CPT

## 2017-11-29 PROCEDURE — C1751 CATH, INF, PER/CENT/MIDLINE: HCPCS

## 2017-11-29 RX ADMIN — SODIUM CHLORIDE 75 ML/HR: 0.9 INJECTION, SOLUTION INTRAVENOUS at 03:55

## 2017-11-29 RX ADMIN — OLANZAPINE 2.5 MG: 5 TABLET, FILM COATED ORAL at 21:16

## 2017-11-29 RX ADMIN — DIAPER RASH SKIN PROTECTENT 1 APPLICATION: at 11:49

## 2017-11-29 RX ADMIN — GABAPENTIN 100 MG: 100 CAPSULE ORAL at 21:15

## 2017-11-29 RX ADMIN — MICONAZOLE NITRATE: 2 CREAM TOPICAL at 17:52

## 2017-11-29 RX ADMIN — ACETAMINOPHEN 650 MG: 325 TABLET ORAL at 21:15

## 2017-11-29 RX ADMIN — SODIUM CHLORIDE 1000 MG: 9 INJECTION, SOLUTION INTRAVENOUS at 03:55

## 2017-11-29 RX ADMIN — OXYBUTYNIN CHLORIDE 5 MG: 5 TABLET, EXTENDED RELEASE ORAL at 11:58

## 2017-11-29 RX ADMIN — SERTRALINE HYDROCHLORIDE 100 MG: 100 TABLET ORAL at 11:57

## 2017-11-29 RX ADMIN — METOPROLOL TARTRATE 12.5 MG: 25 TABLET ORAL at 11:57

## 2017-11-29 RX ADMIN — METOPROLOL TARTRATE 12.5 MG: 25 TABLET ORAL at 21:20

## 2017-11-29 RX ADMIN — VITAMIN D, TAB 1000IU (100/BT) 2000 UNITS: 25 TAB at 11:57

## 2017-11-29 RX ADMIN — OLANZAPINE 2.5 MG: 5 TABLET, FILM COATED ORAL at 11:58

## 2017-11-29 RX ADMIN — MELATONIN TAB 3 MG 3 MG: 3 TAB at 21:15

## 2017-11-29 RX ADMIN — ACETAMINOPHEN 650 MG: 325 TABLET ORAL at 05:15

## 2017-11-29 RX ADMIN — LEVOTHYROXINE SODIUM 25 MCG: 25 TABLET ORAL at 05:15

## 2017-11-29 RX ADMIN — DIAPER RASH SKIN PROTECTENT 1 APPLICATION: at 17:52

## 2017-11-29 NOTE — PROCEDURES
Insert PICC line  Date/Time: 11/29/2017 10:00 AM  Performed by: Vianey Lopez by: Pamela Santoyo     Patient location:  Bedside  Other Assisting Provider: No    Consent:     Consent obtained:  Written (Richi Helton obtained consent )    Consent given by:  Patient  Universal protocol:     Procedure explained and questions answered to patient or proxy's satisfaction: yes      Relevant documents present and verified: yes      Test results available and properly labeled: yes      Imaging studies available: yes      Required blood products, implants, devices, and special equipment available: yes      Site/side marked: yes      Immediately prior to procedure, a time out was called: yes      Patient identity confirmed:  Verbally with patient and arm band  Pre-procedure details:     Hand hygiene: Hand hygiene performed prior to insertion      Sterile barrier technique: All elements of maximal sterile technique followed      Skin preparation:  ChloraPrep    Skin preparation agent: Skin preparation agent completely dried prior to procedure    Indications:     PICC line indications: long term antibiotics    Anesthesia (see MAR for exact dosages):      Anesthesia method:  None (pt has allergy to lidocaine )  Procedure details:     Location:  Basilic    Vessel type: vein      Laterality:  Right    Approach: percutaneous technique used      Patient position:  Flat    Procedural supplies:  Double lumen    Catheter size:  5 Fr    Landmarks identified: yes      Ultrasound guidance: yes      Sterile ultrasound techniques: Sterile gel and sterile probe covers were used      Number of attempts:  1    Successful placement: yes      Vessel of catheter tip end:  Chest Xray needed to confirm placement    Total catheter length (cm):  47    Catheter out on skin (cm):  0    Max flow rate:  999ml/hr     Arm circumference:  29  Post-procedure details:     Post-procedure:  Securement device placed and dressing applied    Assessment: Blood return through all ports and placement verification pending x-ray result    Post-procedure complications: none      Patient tolerance of procedure:   Tolerated well, no immediate complications  Comments:      LOt # [id number] EXP 10-31-18

## 2017-11-29 NOTE — PROGRESS NOTES
UROLOGY PROGRESS NOTE   Patient Identifiers: Elijah Fan (MRN [id number])  Date of Service: 11/29/2017    Subjective:     24 HR EVENTS:   no significant events  Patient has  no complaints  Celaya draining minh urine  Has L ureteral stent x2 , changed yesterday and one is retained to stone  Will need stent change in 8 wks which will be arranged as Op  No further urologic intervention is warranted  Objective:     VITALS:    Vitals:    11/29/17 0748   BP: 138/77   Pulse: 82   Resp: 16   Temp: 98 9 °F (37 2 °C)   SpO2: 97%       INS & OUTS:  I/O last 24 hours: In: 1902 5 [P O :240;  I V :1662 5]  Out: 2225 [Urine:2225]    LABS:  Lab Results   Component Value Date    HGB 9 0 (L) 11/27/2017    HCT 28 3 (L) 11/27/2017    WBC 5 54 11/27/2017     11/27/2017   ]    Lab Results   Component Value Date     11/27/2017    K 3 6 11/27/2017     (H) 11/27/2017    CO2 20 (L) 11/27/2017    BUN 33 (H) 11/27/2017    CREATININE 1 27 11/27/2017    CALCIUM 9 9 11/27/2017    GLUCOSE 86 11/27/2017   ]    INPATIENT MEDS:    Current Facility-Administered Medications:     acetaminophen (TYLENOL) tablet 650 mg, 650 mg, Oral, Q6H [address], Norm Ward MD, 650 mg at 11/29/17 0515    bisacodyl (DULCOLAX) rectal suppository 10 mg, 10 mg, Rectal, Daily PRN, Norm Ward MD    cholecalciferol (VITAMIN D3) tablet 2,000 Units, 2,000 Units, Oral, Daily, Norm Ward MD, 2,000 Units at 11/27/17 1040    ertapenem (INVanz) 1,000 mg in sodium chloride 0 9 % 50 mL IVPB, 1,000 mg, Intravenous, Q24H, Leticia Verde DO, Last Rate: 100 mL/hr at 11/29/17 0355, 1,000 mg at 11/29/17 0355    gabapentin (NEURONTIN) capsule 100 mg, 100 mg, Oral, TID, Norm Ward MD, 100 mg at 11/28/17 2038    influenza inactivated quadrivalent vaccine (FLULAVAL) IM injection 0 5 mL, 0 5 mL, Intramuscular, Prior to discharge, Norm Ward MD    levothyroxine tablet 25 mcg, 25 mcg, Oral, Early Morning, Norm Ward MD, 25 mcg at 11/29/17 0515    lidocaine (LIDODERM) 5 % patch 1 patch, 1 patch, Transdermal, Daily, Demetri Peralta MD, 1 patch at 11/27/17 1042    magnesium hydroxide (MILK OF MAGNESIA) 400 mg/5 mL oral suspension 30 mL, 30 mL, Oral, Daily PRN, Demetri Peralta MD    melatonin tablet 3 mg, 3 mg, Oral, HS, Demetri Peralta MD, 3 mg at 11/28/17 2240    metoprolol tartrate (LOPRESSOR) partial tablet 12 5 mg, 12 5 mg, Oral, Q12H EVELYN, Alida Ambron, DO, 12 5 mg at 11/28/17 2038    miconazole 2 % cream, , Topical, BID, Demetri Peralta MD    OLANZapine (ZyPREXA) tablet 2 5 mg, 2 5 mg, Oral, BID, Demetri Peralta MD, 2 5 mg at 11/28/17 1721    ondansetron (ZOFRAN) injection 4 mg, 4 mg, Intravenous, Q6H PRN, Demetri Peralta MD    oxybutynin (DITROPAN-XL) 24 hr tablet 5 mg, 5 mg, Oral, Daily, Demetri Peralta MD, 5 mg at 11/27/17 1040    schraggers paste 1 application, 1 application, Topical, BID, KATHIA Christensen, 1 application at 78/28/99 1724    sertraline (ZOLOFT) tablet 100 mg, 100 mg, Oral, Daily, Demetri Peralta MD, 100 mg at 11/27/17 1040    simethicone (MYLICON) chewable tablet 80 mg, 80 mg, Oral, Q6H PRN, Demetri Peralta MD    sodium chloride (OCEAN) 0 65 % nasal spray 1 spray, 1 spray, Each Nare, Daily, Demetri Peralta MD, 1 spray at 11/26/17 0859    sodium chloride 0 9 % bolus 1,000 mL, 1,000 mL, Intravenous, Once **FOLLOWED BY** [COMPLETED] sodium chloride 0 9 % bolus 1,000 mL, 1,000 mL, Intravenous, Once, Stopped at 11/23/17 1451 **FOLLOWED BY** [COMPLETED] sodium chloride 0 9 % bolus 1,000 mL, 1,000 mL, Intravenous, Once, Stopped at 11/23/17 1600 **FOLLOWED BY** [COMPLETED] sodium chloride 0 9 % bolus 1,000 mL, 1,000 mL, Intravenous, Once, KATHIA Crespo, Stopped at 11/23/17 1618    sodium chloride 0 9 % infusion, 75 mL/hr, Intravenous, Continuous, Alida Knowles DO, Last Rate: 75 mL/hr at 11/29/17 0355, 75 mL/hr at 11/29/17 0355      Physical Exam:     GEN: resting comfortable , denies flank pain    RESP: breathing comfortably with no accessory muscle use    ABD: soft, non-tender, non-distended   EXT: bilateral compression boots   LYN: in place draining clear yellow urine  no clots and       RADIOLOGY:   Renal Us: L Staghorn calculus with no hydro    Assessment:   L Staghorn Calculus  UTI     Plan:   -Change internal L stent in 8 wks as OP  -will sign off  -  -  -       RN participated in rounds with AP  Plan of care discussed with patient and RN

## 2017-11-29 NOTE — PROGRESS NOTES
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 exchange  Patient remains high risk for urinary tract infection due to retained stent and presence of large staghorn calculus  Acute kidney injury (HCC)-resolved  Check labs in a m  Hemiparesis affecting left side as late effect of cerebrovascular accident (HCC)-patient is more deconditioned due to her acute illness and sepsis  Re consult PT for exercises    PAF-restart Xarelto in a m  Hypothyroidism-levothyroxine daily    Dementia-watch for delirium      VTE Pharmacologic Prophylaxis:   Pharmacologic: Rivaroxaban (Xarelto)  Mechanical VTE Prophylaxis in Place: Yes      Education and Discussions with Family / Patient:  Spoke with son Tanika Alvarado at bedside    Time Spent for Care: 20 minutes  More than 50% of total time spent on counseling and coordination of care as described above  Current Length of Stay: 6 day(s)    Current Patient Status: Inpatient   Certification Statement: The patient will continue to require additional inpatient hospital stay due to Sepsis    Discharge Plan:  Return to [facility]    Code Status: Level 3 - DNAR and DNI      Subjective:   Status post PICC  Slight discomfort from the PICC site  Denies pain from the abdomen    No fever    Objective:     Vitals:   Temp (24hrs), Av 6 °F (36 4 °C), Min:96 9 °F (36 1 °C), Max:98 9 °F (37 2 °C)    HR:  [] 76  Resp:  [16] 16  BP: (121-143)/(77-86) 143/85  SpO2:  [95 %-97 %] 97 %  Body mass index is 41 38 kg/m²  Input and Output Summary (last 24 hours): Intake/Output Summary (Last 24 hours) at 11/29/17 1804  Last data filed at 11/29/17 1548   Gross per 24 hour   Intake           1202 5 ml   Output             2700 ml   Net          -1497 5 ml       Physical Exam:     Physical Exam   Constitutional: She appears well-developed  Obese   HENT:   Mouth/Throat: No oropharyngeal exudate  Eyes: No scleral icterus  Cardiovascular: Regular rhythm  No murmur heard  Pulmonary/Chest: Effort normal  No respiratory distress  She has no wheezes  Abdominal: Soft  She exhibits no distension  There is no tenderness  Genitourinary:   Genitourinary Comments: Celaya with minh colored output   Musculoskeletal: She exhibits no edema  Right arm picc cdi   Neurological: She is alert  Chronic left hemiparesis       Additional Data:     Labs:      Results from last 7 days  Lab Units 11/27/17  0448  11/22/17  2058   WBC Thousand/uL 5 54  < > 17 58*   HEMOGLOBIN g/dL 9 0*  < > 11 7   HEMATOCRIT % 28 3*  < > 36 3   PLATELETS Thousands/uL 231  < > 208   LYMPHO PCT %  --   --  6*   MONO PCT MAN %  --   --  4   EOSINO PCT MANUAL %  --   --  0   < > = values in this interval not displayed  Results from last 7 days  Lab Units 11/27/17  0448   SODIUM mmol/L 144   POTASSIUM mmol/L 3 6   CHLORIDE mmol/L 114*   CO2 mmol/L 20*   BUN mg/dL 33*   CREATININE mg/dL 1 27   CALCIUM mg/dL 9 9   GLUCOSE RANDOM mg/dL 86           * I Have Reviewed All Lab Data Listed Above    * Additional Pertinent Lab Tests Reviewed: No New Labs Available For Today    Imaging:    Imaging Reports Reviewed Today Include: no new imaging      Recent Cultures (last 7 days):       Results from last 7 days  Lab Units 11/28/17  0936 11/23/17  1447 11/23/17  1348 11/23/17  1334 11/22/17  2319   BLOOD CULTURE   --   --    Klebsiella-Enterobacter  group* Klebsiella pneumoniae ESBL*   Klebsiella-Enterobacter  group*  Klebsiella pneumoniae ESBL*   GRAM STAIN RESULT   --   --  Gram negative rods Gram negative rods Gram negative rods  Gram negative rods   URINE CULTURE  Culture too young- will reincubate 60,000-69,000 cfu/ml Enterococcus faecalis*  7877-6312 cfu/ml Proteus species*  1317-4148 cfu/ml Klebsiella-Enterobacter  group*  --   --   --        Last 24 Hours Medication List:     acetaminophen 650 mg Oral Q6H [address]   cholecalciferol 2,000 Units Oral Daily   ertapenem 1,000 mg Intravenous Q24H   gabapentin 100 mg Oral TID   levothyroxine 25 mcg Oral Early Morning   lidocaine 1 patch Transdermal Daily   melatonin 3 mg Oral HS   metoprolol tartrate 12 5 mg Oral Q12H EVELYN   miconazole  Topical BID   OLANZapine 2 5 mg Oral BID   oxybutynin 5 mg Oral Daily   schraggers 1 application Topical BID   sertraline 100 mg Oral Daily   sodium chloride 1 spray Each Nare Daily   sodium chloride 1,000 mL Intravenous Once        Today, Patient Was Seen By: Praneeth Florentino MD    ** Please Note: Dragon 360 Dictation voice to text software may have been used in the creation of this document   **

## 2017-11-29 NOTE — PROGRESS NOTES
Progress Note - Infectious Disease   Robinson Mackay 68 y o  female MRN: [id number]  Unit/Bed#: E2 -01 Encounter: [id number]         Impression/Recommendations:    1  Severe sepsis, POA   Tachycardia, leukocytosis, complicated by hypotension and lactic acidosis   Likely secondary to #2 and #3   Lactic acidosis resolved  Remains afebrile  Leukocytosis resolved  -  Antibiotics as below  - monitor WBC count and temperatures  - monitor hemodynamics      2    ESBL Klebsiella bacteremia   Likely secondary to #3   -  Antibiotics as below for total of 14 days  -  Can place PICC line       3  Probable UTI/pyelonephritis    In the setting of left staghorn calculus with retained encrusted old ureteral stent and new ureteral stent placed in 10/17  Now status post exchange of newer stent  On 11/28  Old stent remains in place  Urine culture from 09/15/2017 grew greater than 100,000 Proteus, > 100,000  ESBL Klebsiella, and  pansensitive E coli  Urine culture from 07/14/2017 grew ESBL Klebsiella and Proteus  Now urine culture (post-ertapenem) positive for Enterococcus faecalis, Proteus, Klebsiella  Blood cultures grew ESBL Klebsiella  I suspect that the enterococcus faecalis in the urine is more likely due to colonization as only the Klebsiella was present in the blood and patient has clinically improved  Patient is now status post exchange of new left stent  Old left stent is encrusted and remains in place      - continue with ertapenem at current dose  Will need 14 days  End date 12/06  Can place PICC line today  - follow-up urine culture sent during cystoscopy on 11/28      4  History of left staghorn calculus status post encrusted old ureteral stent and new ureteral stent placed in 10/17  The old stent cannot be removed as it is severely encrusted   This will clearly put the patient at risk for bacterial colonization of the old stent, as well as risk for recurrent urinary tract infections   Now status post exchange of newer stent  On   Old stent remains in place    - will need new stent exchanged in 8 weeks  -  Follow up with Urology as outpatient       5  LALITA   In the setting of #1  Prior creatinine at [facility] in  was 0 7   Currently receiving IV fluids  Creatinine continues to improve  - monitor creatinine closely  - will dose adjust antibiotics based on renal function     Antibiotics:   ertapenem D7     Spoke with Dr Ariel Rodriguez regarding plan of care  Subjective:  Patient seen on AM rounds  Denies fevers, chills, or sweats  Denies nausea, vomiting, or diarrhea  Objective:  Vitals:  HR:  [] 82  Resp:  [15-19] 16  BP: (121-172)/(72-88) 138/77  SpO2:  [94 %-97 %] 97 %  Temp (24hrs), Av 8 °F (36 6 °C), Min:96 9 °F (36 1 °C), Max:98 9 °F (37 2 °C)  Current: Temperature: 98 9 °F (37 2 °C)    Physical Exam:   General:  No acute distress  Psychiatric:  Awake and alert  Pulmonary:  Normal respiratory excursion without accessory muscle use  Abdomen:  Soft, nontender  Extremities:  No edema  Skin:  No rashes    Celaya in place  Lab Results:  I have personally reviewed pertinent labs      Results from last 7 days  Lab Units 17  0458   SODIUM mmol/L 144 145 141   POTASSIUM mmol/L 3 6 3 9 3 7   CHLORIDE mmol/L 114* 115* 110*   CO2 mmol/L 20* 20* 21   ANION GAP mmol/L 10 10 10   BUN mg/dL 33* 37* 42*   CREATININE mg/dL 1 27 1 48* 2 09*   EGFR ml/min/1 73sq m 41 34 22   GLUCOSE RANDOM mg/dL 86 81 110   CALCIUM mg/dL 9 9 10 0 10 0       Results from last 7 days  Lab Units 17  0448 17  0447 17  0614   WBC Thousand/uL 5 54 6 51 13 21*   HEMOGLOBIN g/dL 9 0* 8 8* 10 2*   PLATELETS Thousands/uL 231 215 190       Results from last 7 days  Lab Units 17  1447 17  1348 17  1334 17  2319   BLOOD CULTURE   --    Klebsiella-Enterobacter  group*   Klebsiella pneumoniae ESBL*   Klebsiella-Enterobacter  group*  Klebsiella pneumoniae ESBL*   GRAM STAIN RESULT   --  Gram negative rods Gram negative rods Gram negative rods  Gram negative rods   URINE CULTURE  60,000-69,000 cfu/ml Enterococcus faecalis*  2260-5978 cfu/ml Proteus species*  5611-2802 cfu/ml Klebsiella-Enterobacter  group*  --   --   --        Imaging Studies:   I have personally reviewed pertinent imaging study reports and images in PACS  EKG, Pathology, and Other Studies:   I have personally reviewed pertinent reports

## 2017-11-30 LAB
ANION GAP SERPL CALCULATED.3IONS-SCNC: 9 MMOL/L (ref 4–13)
ANISOCYTOSIS BLD QL SMEAR: PRESENT
BASOPHILS # BLD MANUAL: 0.09 THOUSAND/UL (ref 0–0.1)
BASOPHILS NFR MAR MANUAL: 1 % (ref 0–1)
BUN SERPL-MCNC: 20 MG/DL (ref 5–25)
CALCIUM SERPL-MCNC: 9.5 MG/DL (ref 8.3–10.1)
CHLORIDE SERPL-SCNC: 116 MMOL/L (ref 100–108)
CO2 SERPL-SCNC: 24 MMOL/L (ref 21–32)
CREAT SERPL-MCNC: 1.12 MG/DL (ref 0.6–1.3)
DACRYOCYTES BLD QL SMEAR: PRESENT
EOSINOPHIL # BLD MANUAL: 0 THOUSAND/UL (ref 0–0.4)
EOSINOPHIL NFR BLD MANUAL: 0 % (ref 0–6)
ERYTHROCYTE [DISTWIDTH] IN BLOOD BY AUTOMATED COUNT: 16.4 % (ref 11.6–15.1)
GFR SERPL CREATININE-BSD FRML MDRD: 47 ML/MIN/1.73SQ M
GLUCOSE SERPL-MCNC: 90 MG/DL (ref 65–140)
HCT VFR BLD AUTO: 27.5 % (ref 34.8–46.1)
HGB BLD-MCNC: 8.8 G/DL (ref 11.5–15.4)
LYMPHOCYTES # BLD AUTO: 2.13 THOUSAND/UL (ref 0.6–4.47)
LYMPHOCYTES # BLD AUTO: 25 % (ref 14–44)
MCH RBC QN AUTO: 27.8 PG (ref 26.8–34.3)
MCHC RBC AUTO-ENTMCNC: 32 G/DL (ref 31.4–37.4)
MCV RBC AUTO: 87 FL (ref 82–98)
MONOCYTES # BLD AUTO: 0.26 THOUSAND/UL (ref 0–1.22)
MONOCYTES NFR BLD: 3 % (ref 4–12)
MYELOCYTES NFR BLD MANUAL: 1 % (ref 0–1)
NEUTROPHILS # BLD MANUAL: 5.88 THOUSAND/UL (ref 1.85–7.62)
NEUTS BAND NFR BLD MANUAL: 1 % (ref 0–8)
NEUTS SEG NFR BLD AUTO: 68 % (ref 43–75)
NRBC BLD AUTO-RTO: 0 /100 WBCS
OVALOCYTES BLD QL SMEAR: PRESENT
PLATELET # BLD AUTO: 281 THOUSANDS/UL (ref 149–390)
PLATELET BLD QL SMEAR: ADEQUATE
PMV BLD AUTO: 9.1 FL (ref 8.9–12.7)
POTASSIUM SERPL-SCNC: 2.8 MMOL/L (ref 3.5–5.3)
RBC # BLD AUTO: 3.17 MILLION/UL (ref 3.81–5.12)
SODIUM SERPL-SCNC: 149 MMOL/L (ref 136–145)
TOTAL CELLS COUNTED SPEC: 100
VARIANT LYMPHS # BLD AUTO: 1 %
WBC # BLD AUTO: 8.52 THOUSAND/UL (ref 4.31–10.16)

## 2017-11-30 PROCEDURE — 80048 BASIC METABOLIC PNL TOTAL CA: CPT | Performed by: INTERNAL MEDICINE

## 2017-11-30 PROCEDURE — 85007 BL SMEAR W/DIFF WBC COUNT: CPT | Performed by: INTERNAL MEDICINE

## 2017-11-30 PROCEDURE — 85027 COMPLETE CBC AUTOMATED: CPT | Performed by: INTERNAL MEDICINE

## 2017-11-30 RX ORDER — POTASSIUM CHLORIDE 14.9 MG/ML
20 INJECTION INTRAVENOUS
Status: COMPLETED | OUTPATIENT
Start: 2017-11-30 | End: 2017-11-30

## 2017-11-30 RX ORDER — SODIUM CHLORIDE 450 MG/100ML
75 INJECTION, SOLUTION INTRAVENOUS CONTINUOUS
Status: DISCONTINUED | OUTPATIENT
Start: 2017-11-30 | End: 2017-12-01 | Stop reason: HOSPADM

## 2017-11-30 RX ORDER — POTASSIUM CHLORIDE 29.8 MG/ML
40 INJECTION INTRAVENOUS ONCE
Status: DISCONTINUED | OUTPATIENT
Start: 2017-11-30 | End: 2017-11-30

## 2017-11-30 RX ADMIN — SODIUM CHLORIDE 75 ML/HR: 0.45 INJECTION, SOLUTION INTRAVENOUS at 13:15

## 2017-11-30 RX ADMIN — GABAPENTIN 100 MG: 100 CAPSULE ORAL at 09:51

## 2017-11-30 RX ADMIN — SERTRALINE HYDROCHLORIDE 100 MG: 100 TABLET ORAL at 09:51

## 2017-11-30 RX ADMIN — LEVOTHYROXINE SODIUM 25 MCG: 25 TABLET ORAL at 05:24

## 2017-11-30 RX ADMIN — DIAPER RASH SKIN PROTECTENT 1 APPLICATION: at 09:54

## 2017-11-30 RX ADMIN — DIAPER RASH SKIN PROTECTENT 1 APPLICATION: at 18:23

## 2017-11-30 RX ADMIN — MELATONIN TAB 3 MG 3 MG: 3 TAB at 22:08

## 2017-11-30 RX ADMIN — POTASSIUM CHLORIDE 20 MEQ: 200 INJECTION, SOLUTION INTRAVENOUS at 15:15

## 2017-11-30 RX ADMIN — ACETAMINOPHEN 650 MG: 325 TABLET ORAL at 05:24

## 2017-11-30 RX ADMIN — POTASSIUM CHLORIDE 20 MEQ: 200 INJECTION, SOLUTION INTRAVENOUS at 13:16

## 2017-11-30 RX ADMIN — MICONAZOLE NITRATE: 2 CREAM TOPICAL at 09:54

## 2017-11-30 RX ADMIN — GABAPENTIN 100 MG: 100 CAPSULE ORAL at 22:08

## 2017-11-30 RX ADMIN — ACETAMINOPHEN 650 MG: 325 TABLET ORAL at 13:16

## 2017-11-30 RX ADMIN — METOPROLOL TARTRATE 12.5 MG: 25 TABLET ORAL at 09:51

## 2017-11-30 RX ADMIN — OLANZAPINE 2.5 MG: 5 TABLET, FILM COATED ORAL at 09:54

## 2017-11-30 RX ADMIN — MICONAZOLE NITRATE: 2 CREAM TOPICAL at 18:23

## 2017-11-30 RX ADMIN — SODIUM CHLORIDE 1000 MG: 9 INJECTION, SOLUTION INTRAVENOUS at 03:38

## 2017-11-30 RX ADMIN — METOPROLOL TARTRATE 12.5 MG: 25 TABLET ORAL at 22:08

## 2017-11-30 RX ADMIN — RIVAROXABAN 20 MG: 20 TABLET, FILM COATED ORAL at 09:51

## 2017-11-30 RX ADMIN — OXYBUTYNIN CHLORIDE 5 MG: 5 TABLET, EXTENDED RELEASE ORAL at 09:51

## 2017-11-30 NOTE — PROGRESS NOTES
Progress Note - Infectious Disease   Minor Garcia 68 y o  female MRN: [id number]  Unit/Bed#: E2 -01 Encounter: [id number]      Impression/Recommendations:    1  Severe sepsis, POA   Tachycardia, leukocytosis, complicated by hypotension and lactic acidosis   Likely secondary to #2 and #3   Lactic acidosis resolved  Remains afebrile   Leukocytosis resolved  -  Antibiotics as below  - monitor WBC count and temperatures  - monitor hemodynamics      2    ESBL Klebsiella bacteremia   Likely secondary to #3   -  Antibiotics as below for total of 14 days  -  Can place PICC line       3  Probable UTI/pyelonephritis    In the setting of left staghorn calculus with retained encrusted old ureteral stent and new ureteral stent placed in 10/17  Now status post exchange of newer stent  On 11/28  Old stent remains in place  Urine culture from 09/15/2017 grew greater than 100,000 Proteus, > 100,000  ESBL Klebsiella, and  pansensitive E coli  Urine culture from 07/14/2017 grew ESBL Klebsiella and Proteus  Now urine culture (post-ertapenem) positive for Enterococcus faecalis, Proteus, Klebsiella  Blood cultures grew ESBL Klebsiella   I suspect that the enterococcus faecalis in the urine is more likely due to colonization as only the Klebsiella was present in the blood and patient has clinically improved  Patient is now status post exchange of new left stent  Old left stent is encrusted and remains in place      - continue with ertapenem at current dose   Will need 14 days  End date 12/06  PICC line placed  - follow-up urine culture sent during cystoscopy on 11/28       4  History of left staghorn calculus status post encrusted old ureteral stent and new ureteral stent placed in 10/17  The old stent cannot be removed as it is severely encrusted   This will clearly put the patient at risk for bacterial colonization of the old stent, as well as risk for recurrent urinary tract infections   Now status post exchange of newer stent  On   Old stent remains in place    - will need new stent exchanged in 8 weeks  -  Follow up with Urology as outpatient       5  LALITA   In the setting of #1  Prior creatinine at [facility] in  was 0 7   Currently receiving IV fluids   Creatinine continues to improve  - monitor creatinine closely  - will dose adjust antibiotics based on renal function     Antibiotics:   ertapenem D8     Dispo: pending placement back to nursing home  Will sign off  Please call with questions  Subjective:  Patient seen on AM rounds  Denies fevers, chills, or sweats  Denies nausea, vomiting, or diarrhea  Objective:  Vitals:  HR:  [76-94] 79  Resp:  [15-18] 15  BP: (135-146)/(74-92) 135/74  SpO2:  [94 %-97 %] 95 %  Temp (24hrs), Av 9 °F (36 6 °C), Min:97 7 °F (36 5 °C), Max:98 2 °F (36 8 °C)  Current: Temperature: 98 2 °F (36 8 °C)    Physical Exam:   General:  No acute distress  Psychiatric:  Awake and alert  Pulmonary:  Normal respiratory excursion without accessory muscle use  Abdomen:  Soft, nontender  Extremities:  No edema  Skin:  No rashes   PICC line is clean    Celaya in place    Lab Results:  I have personally reviewed pertinent labs      Results from last 7 days  Lab Units 17  044   SODIUM mmol/L 149* 144 145   POTASSIUM mmol/L 2 8* 3 6 3 9   CHLORIDE mmol/L 116* 114* 115*   CO2 mmol/L 24 20* 20*   ANION GAP mmol/L 9 10 10   BUN mg/dL 20 33* 37*   CREATININE mg/dL 1 12 1 27 1 48*   EGFR ml/min/1 73sq m 47 41 34   GLUCOSE RANDOM mg/dL 90 86 81   CALCIUM mg/dL 9 5 9 9 10 0       Results from last 7 days  Lab Units 17  05178 17  044   WBC Thousand/uL 8 52 5 54 6 51   HEMOGLOBIN g/dL 8 8* 9 0* 8 8*   PLATELETS Thousands/uL 281 231 215       Results from last 7 days  Lab Units 17  0936 17  1447 17  1348 17  1334   BLOOD CULTURE   --   --    Klebsiella-Enterobacter  group*   Klebsiella pneumoniae ESBL* GRAM STAIN RESULT   --   --  Gram negative rods Gram negative rods   URINE CULTURE  Culture too young- will reincubate 60,000-69,000 cfu/ml Enterococcus faecalis*  0375-0878 cfu/ml Proteus species*  7292-5260 cfu/ml Klebsiella-Enterobacter  group*  --   --        Imaging Studies:   I have personally reviewed pertinent imaging study reports and images in PACS  EKG, Pathology, and Other Studies:   I have personally reviewed pertinent reports

## 2017-11-30 NOTE — PROGRESS NOTES
[address] Internal Medicine Progress Note  Patient: Sumit Do 68 y o  female   MRN: [id number]  PCP: Amber Cazares MD  Unit/Bed#: E2 -27 Encounter: [id number]  Date Of Visit: 11/30/17    Assessment and plan:    Principal Problem:    Sepsis due to urinary tract infection ([address] )- day 8 of 14 of ertapenem for ESBL Klebsiella sepsis  PICC line placed  Clinically improved  Plan is to complete the rest of her antibiotics at the nursing home  Active Problems:    Complicated urinary tract infection-in the setting of staghorn calculus and retained ureteral stent  Continue antibiotics as above  Staghorn renal calculus-status post cystoscopy with stent exchange  She remains high risk for UTI due to retained stent and large staghorn calculus  Follow up Urology in 8 weeks for stent exchange  DC Celaya catheter  Discussed with Preston Coy earlier today    Acute kidney injury (HCC)-resolved    Hemiparesis affecting left side as late effect of cerebrovascular accident (HCC)-out of bed and support per nursing  PAF-Xarelto restarted   Hypothyroidism-levothyroxine daily    Hypokalemia- IV potassium 40 mEq x1    Hypernatremia-give half normal saline 75 mL/hour          VTE Pharmacologic Prophylaxis:   Pharmacologic: Rivaroxaban (Xarelto)  Mechanical VTE Prophylaxis in Place: No    Discussions with Specialists or Other Care Team Provider:  Spoke with case management    Education and Discussions with Family / Patient:  Spoke with son Mat at bedside    Time Spent for Care: 20 minutes  More than 50% of total time spent on counseling and coordination of care as described above  Current Length of Stay: 7 day(s)    Current Patient Status: Inpatient   Certification Statement: The patient will continue to require additional inpatient hospital stay due to Hypernatremia and hypokalemia    Discharge Plan:  In 24-48 hours    Code Status: Level 3 - DNAR and DNI      Subjective:   No fever or chills  No palpitations  Feels weak  Objective:     Vitals:   Temp (24hrs), Av 9 °F (36 6 °C), Min:97 7 °F (36 5 °C), Max:98 2 °F (36 8 °C)    HR:  [76-94] 79  Resp:  [15-18] 15  BP: (135-146)/(74-92) 135/74  SpO2:  [94 %-97 %] 95 %  Body mass index is 41 38 kg/m²  Input and Output Summary (last 24 hours): Intake/Output Summary (Last 24 hours) at 17 1505  Last data filed at 17 0950   Gross per 24 hour   Intake                0 ml   Output             2025 ml   Net            -5 ml       Physical Exam:     Physical Exam   Constitutional: No distress  obese   HENT:   Head: Normocephalic  Eyes: No scleral icterus  Cardiovascular: Regular rhythm  No murmur heard  Pulmonary/Chest: Effort normal  No stridor  No respiratory distress  She has no wheezes  Abdominal: Soft  She exhibits no distension  Musculoskeletal: She exhibits no edema  Neurological: She is alert  Left hemiparesis   Skin: Skin is warm  She is not diaphoretic  Psychiatric:   Dysphoric mood       Additional Data:     Labs:      Results from last 7 days  Lab Units 17  0524   WBC Thousand/uL 8 52   HEMOGLOBIN g/dL 8 8*   HEMATOCRIT % 27 5*   PLATELETS Thousands/uL 281   LYMPHO PCT % 25   MONO PCT MAN % 3*   EOSINO PCT MANUAL % 0       Results from last 7 days  Lab Units 17  0524   SODIUM mmol/L 149*   POTASSIUM mmol/L 2 8*   CHLORIDE mmol/L 116*   CO2 mmol/L 24   BUN mg/dL 20   CREATININE mg/dL 1 12   CALCIUM mg/dL 9 5   GLUCOSE RANDOM mg/dL 90           * I Have Reviewed All Lab Data Listed Above  * Additional Pertinent Lab Tests Reviewed: All Labs Within Last 24 Hours Reviewed    Imaging:    Imaging Reports Reviewed Today Include:  No new imaging      Recent Cultures (last 7 days):       Results from last 7 days  Lab Units 17  0936   URINE CULTURE  Culture results to follow         Last 24 Hours Medication List:     acetaminophen 650 mg Oral Q6H [facility]   ertapenem 1,000 mg Intravenous Q24H   gabapentin 100 mg Oral TID   levothyroxine 25 mcg Oral Early Morning   lidocaine 1 patch Transdermal Daily   melatonin 3 mg Oral HS   metoprolol tartrate 12 5 mg Oral Q12H EVELYN   miconazole  Topical BID   OLANZapine 2 5 mg Oral BID   oxybutynin 5 mg Oral Daily   potassium chloride 20 mEq Intravenous Q2H   rivaroxaban 20 mg Oral Daily With Breakfast   schraggers 1 application Topical BID   sertraline 100 mg Oral Daily   sodium chloride 1 spray Each Nare Daily   sodium chloride 1,000 mL Intravenous Once        Today, Patient Was Seen By: Florentin Wilks MD    ** Please Note: Dragon 360 Dictation voice to text software may have been used in the creation of this document   **

## 2017-11-30 NOTE — PROGRESS NOTES
Pt refused to be assessed, and to have her backside washed by PCA; pt was also belligerent towards the RN when trying to give her meds, she was biting the spoon and pushed the nurse away after 3rd spoonful

## 2017-12-01 VITALS
WEIGHT: 256.39 LBS | HEIGHT: 66 IN | SYSTOLIC BLOOD PRESSURE: 136 MMHG | TEMPERATURE: 98.6 F | OXYGEN SATURATION: 96 % | DIASTOLIC BLOOD PRESSURE: 74 MMHG | HEART RATE: 74 BPM | RESPIRATION RATE: 18 BRPM | BODY MASS INDEX: 41.21 KG/M2

## 2017-12-01 PROBLEM — N17.9 ACUTE KIDNEY INJURY (HCC): Status: RESOLVED | Noted: 2017-11-23 | Resolved: 2017-12-01

## 2017-12-01 LAB
ANION GAP SERPL CALCULATED.3IONS-SCNC: 6 MMOL/L (ref 4–13)
BUN SERPL-MCNC: 22 MG/DL (ref 5–25)
CALCIUM SERPL-MCNC: 9.5 MG/DL (ref 8.3–10.1)
CHLORIDE SERPL-SCNC: 114 MMOL/L (ref 100–108)
CO2 SERPL-SCNC: 23 MMOL/L (ref 21–32)
CREAT SERPL-MCNC: 1.13 MG/DL (ref 0.6–1.3)
GFR SERPL CREATININE-BSD FRML MDRD: 47 ML/MIN/1.73SQ M
GLUCOSE SERPL-MCNC: 96 MG/DL (ref 65–140)
POTASSIUM SERPL-SCNC: 3.3 MMOL/L (ref 3.5–5.3)
SODIUM SERPL-SCNC: 143 MMOL/L (ref 136–145)

## 2017-12-01 PROCEDURE — 80048 BASIC METABOLIC PNL TOTAL CA: CPT | Performed by: INTERNAL MEDICINE

## 2017-12-01 RX ORDER — POTASSIUM CHLORIDE 14.9 MG/ML
20 INJECTION INTRAVENOUS ONCE
Status: COMPLETED | OUTPATIENT
Start: 2017-12-01 | End: 2017-12-01

## 2017-12-01 RX ADMIN — DIAPER RASH SKIN PROTECTENT 1 APPLICATION: at 09:42

## 2017-12-01 RX ADMIN — ACETAMINOPHEN 650 MG: 325 TABLET ORAL at 05:46

## 2017-12-01 RX ADMIN — SODIUM CHLORIDE 75 ML/HR: 0.45 INJECTION, SOLUTION INTRAVENOUS at 01:27

## 2017-12-01 RX ADMIN — Medication 1 SPRAY: at 09:43

## 2017-12-01 RX ADMIN — MICONAZOLE NITRATE: 2 CREAM TOPICAL at 09:42

## 2017-12-01 RX ADMIN — OXYBUTYNIN CHLORIDE 5 MG: 5 TABLET, EXTENDED RELEASE ORAL at 09:41

## 2017-12-01 RX ADMIN — POTASSIUM CHLORIDE 20 MEQ: 200 INJECTION, SOLUTION INTRAVENOUS at 13:57

## 2017-12-01 RX ADMIN — ACETAMINOPHEN 650 MG: 325 TABLET ORAL at 01:26

## 2017-12-01 RX ADMIN — RIVAROXABAN 20 MG: 20 TABLET, FILM COATED ORAL at 09:41

## 2017-12-01 RX ADMIN — LEVOTHYROXINE SODIUM 25 MCG: 25 TABLET ORAL at 05:46

## 2017-12-01 RX ADMIN — SODIUM CHLORIDE 1000 MG: 9 INJECTION, SOLUTION INTRAVENOUS at 04:51

## 2017-12-01 RX ADMIN — SERTRALINE HYDROCHLORIDE 100 MG: 100 TABLET ORAL at 09:41

## 2017-12-01 RX ADMIN — OLANZAPINE 2.5 MG: 5 TABLET, FILM COATED ORAL at 09:42

## 2017-12-01 RX ADMIN — METOPROLOL TARTRATE 12.5 MG: 25 TABLET ORAL at 09:41

## 2017-12-01 RX ADMIN — GABAPENTIN 100 MG: 100 CAPSULE ORAL at 09:41

## 2017-12-01 NOTE — SOCIAL WORK
CM informed patient is ready for discharge back [facility] arranged [facility] BLS transport with Janes HANEY made physician, UC and RN made aware  No other needs at present  Cm to follow as needed

## 2017-12-01 NOTE — SOCIAL WORK
IMM Notice presented to the patient, patient signed (INITIALED) the notice, & [facility]

## 2017-12-01 NOTE — PHYSICAL THERAPY NOTE
Physical Therapy Screen    Patient Name: Robinson Mackay    Today's Date: 12/1/2017     Problem List  Patient Active Problem List   Diagnosis    Sepsis due to urinary tract infection ([facility] )    Complicated urinary tract infection    Staghorn renal calculus    Hemiparesis affecting left side as late effect of cerebrovascular accident [facility])        Past Medical History  Past Medical History:   Diagnosis Date    Abnormality of gait and mobility     Anemia     Anxiety     Calculus of kidney     Candidiasis of female genitalia     Clostridium difficile infection     History of C-Diff    Constipation     CVA (cerebral vascular accident) ([facility] )     Dementia     Dementia with psychosis     Depression     Diabetes mellitus ([facility] )     No medications    Dry eye syndrome     Dysphagia     Dysuria     Food allergy     matias/papaya    GERD (gastroesophageal reflux disease)     Hand injury     site of mrsa infection- nurse at nursing home denies knowledge of this    Hematuria     Hemiplegia and hemiparesis ([facility] )     left    Hyperlipidemia     Hypertension     Hypothyroid     Insomnia     Irregular heart beat     A-fib    Muscle weakness     left side    Nephrolithiasis     Neuropathy     Paroxysmal atrial fibrillation (HCC)     Polyneuropathy     Seasonal allergies     Seasonal allergies     Shortness of breath     Stroke (HCC)     left sided weakness    TIA (transient ischemic attack)     Ureteral stent retained     replacement today 10/3/2017    Urinary incontinence     UTI (urinary tract infection)     Wears dentures     full set    Wears glasses     Wheelchair bound         Past Surgical History  Past Surgical History:   Procedure Laterality Date    CARPAL TUNNEL RELEASE Right     CYSTOSCOPY      CYSTOSCOPY W/ RETROGRADES Left 11/28/2017    Procedure: CYSTOSCOPY WITH RETROGRADE PYELOGRAM;  Surgeon: Yessica Pacheco MD;  [facility];  Service: Urology    HYSTERECTOMY      LITHOTRIPSY      NH CYSTOSCOPY,INSERT URETERAL STENT Left 7/14/2017    Procedure: CYSTOSCOPY, laser bladder and left urethal stone, bilateral retrograde pyelogram, left uteroscopy, and left stent placement;  Surgeon: Rodney Gong MD;  [facility];  Service: Urology    NH CYSTOSCOPY,INSERT URETERAL STENT Left 10/3/2017    Procedure: Audrene Bless STENT URETERAL;  Surgeon: Rodney Gong MD;  [facility];  Service: Urology    NH CYSTOSCOPY,INSERT URETERAL STENT Left 11/28/2017    Procedure: EXCHANGE STENT URETERAL;  Surgeon: Rodney Gong MD;  [facility];  Service: Urology    SALPINGOOPHORECTOMY Bilateral     URETEROSCOPY Left 10/3/2017    Procedure: URETEROSCOPY;  Surgeon: Rodney Gong MD;  [facility];  Service: Urology        SUBJECT:     OBJECTIVE:  Pt cecelia dependent for OOB at snf  Already screened by PT on 11/26/17  No PT needs  ASSESSMENT:  At baseline, no PT needs  PLAN:   RECOMMENDATION:  Return to snf       Jose G Lowry PT

## 2017-12-01 NOTE — DISCHARGE SUMMARY
[facility]    Patient Information: Inna Huynh 68 y o  female MRN: [id number]  Unit/Bed#: E2 -01 Encounter: [id number]    Discharging Physician / Practitioner: Kalie Don MD  PCP: Merly Celaya MD  Admission Date: 11/22/2017  Discharge Date: 12/01/17    Reason for Admission:  Fever and flank pain    Discharge Diagnoses:     Principal Problem:    Sepsis due to urinary tract infection ([facility] )  Active Problems:    Complicated urinary tract infection    Staghorn renal calculus    Hemiparesis affecting left side as late effect of cerebrovascular accident ([facility] )    PAF    Hypothyroidism  Resolved Problems:    Streptococcal sepsis ([facility] )    Acute kidney injury ([facility] )    Hypernatremia    Consultations During Hospital Stay:  · ID Dr Derrick Pink  · Urology Dr Pili Cardoza    Procedures Performed:     · 11/28/2017-cystoscopy with left retrograde pyelogram and left ureteral stent exchange  · 11/29/2017-Picc placement    Significant Findings:     · Blood culture-positive for ESBL Klebsiella    Incidental Findings:   · None     Test Results Pending at Discharge (will require follow up): · None     Outpatient Tests Requested:  · None    Complications:  None    Hospital Course:     Inna Huynh is a 68 y o  female patient who originally presented to the hospital on 11/22/2017 due to fever and left flank pain  She has known history of left staghorn calculus and indwelling stents with previous urinary tract infections  She presented to the hospital due to persistent symptoms while on Cipro  She was admitted for sepsis due to complicated urinary tract infection due to her indwelling stent and staghorn calculus  She was ultimately found to have bacteremia due to Klebsiella with ESBL  This was from her urinary tract  She was seen by infectious disease  She was started on ertapenem  She underwent cystoscopy and left ureteral stent exchange    She had an indwelling stent that was completely imbedded into her stone and was unable to be removed  The patient underwent a PICC line placement on 11/29/2017  She she will be discharged [facility] to complete her antibiotic course  She will need ertapenem until 12/06/2017  Instructions were placed to remove her PICC after her last dose of antibiotics  She will need a follow up with Urology in 8 weeks for routine stent exchange       Problem list:  · Sepsis due to UTI-successfully treated with ertapenem  Fourteen day course planned  · Paroxysmal atrial fibrillation-her Xarelto was briefly held for her post cystoscopy  This was restarted post procedure  · Chronic hemiparesis-due to old stroke  Left-sided hemiparesis persisted  She was seen by Physical therapy who recommended continued activity as tolerated  · Hypertension-BP was controlled with metoprolol  · Depression-she remained on Zoloft and Zyprexa  · Hypernatremia and hypokalemia-hypernatremia resolved with hypotonic fluid  Hypokalemia was repleted    Condition at Discharge: good     Discharge Day Visit / Exam:     Subjective:  "When am I going home?"  No fever or chills  Celaya catheter removed successfully yesterday  Vitals: Blood Pressure: 136/74 (12/01/17 0740)  Pulse: 74 (12/01/17 0740)  Temperature: 98 6 °F (37 °C) (12/01/17 0740)  Temp Source: Temporal (12/01/17 0740)  Respirations: 18 (12/01/17 0740)  Height: 5' 6" (167 6 cm) (11/23/17 1300)  Weight - Scale: 116 kg (256 lb 6 3 oz) (11/24/17 0435)  SpO2: 96 % (12/01/17 0740)  Exam:   Physical Exam   Constitutional: She appears well-developed  No distress  HENT:   Head: Normocephalic  Eyes: No scleral icterus  Cardiovascular: Regular rhythm  Pulmonary/Chest: Effort normal  No stridor  No respiratory distress  She has no wheezes  Abdominal: She exhibits no distension  There is no tenderness  Neurological: She is alert  Chronic left hemiparesis   Skin: Skin is warm  She is not diaphoretic     Psychiatric: She has a normal mood and affect  Discharge instructions/Information to patient and family:   See after visit summary for information provided to patient and family  Provisions for Follow-Up Care:  See after visit summary for information related to follow-up care and any pertinent home health orders  Disposition:     Jimmy Hancock at [address] to [facility] SNF:   · Not Applicable to this Patient - Not Applicable to this Patient    Planned Readmission: none     Discharge Statement:  I spent >30 minutes discharging the patient  This time was spent on the day of discharge  I had direct contact with the patient on the day of discharge  Greater than 50% of the total time was spent examining patient, answering all patient questions, arranging and discussing plan of care with patient as well as directly providing post-discharge instructions  Additional time then spent on discharge activities  Left voicemail for jose Montana regarding her discharge plan today  Plan discussed with the patient  Discharge Medications:  See after visit summary for reconciled discharge medications provided to patient and family  ** Please Note: Dragon 360 Dictation voice to text software may have been used in the creation of this document   **

## 2017-12-01 NOTE — PLAN OF CARE
DISCHARGE PLANNING - CARE MANAGEMENT     Discharge to post-acute care or home with appropriate resources Adequate for Discharge        INFECTION - ADULT     Absence or prevention of progression during hospitalization Adequate for Discharge     Absence of fever/infection during neutropenic period Adequate for Discharge        METABOLIC, FLUID AND ELECTROLYTES - ADULT     Electrolytes maintained within normal limits Adequate for Discharge     Fluid balance maintained Adequate for Discharge        Potential for Falls     Patient will remain free of falls Adequate for Discharge        Prexisting or High Potential for Compromised Skin Integrity     Skin integrity is maintained or improved Adequate for Discharge

## 2017-12-01 NOTE — DISCHARGE INSTRUCTIONS
Klebsiella sepsis due to UTI- Continue ertapenem x 5 more days via picc  Last dose on 12/6/17  DC PICC after last dose of ertapenem on 12/6/17  Follow up with urology in 8 weeks for stent exchange

## 2017-12-01 NOTE — PROGRESS NOTES
Pt refused medications, stated it makes her have mucous; RN explained that it was tylenol, and zyprexa; pt still refused; she also started to accuse PCA and RN of not washing her, as we were bathing her  C/o of lump in her back when we put new pad protectors under her; we pulled the pad straight as possible and inflated the bed to maximum; pt also stated we did not know how to properly align her during repositioning

## 2017-12-02 LAB
BACTERIA UR CULT: ABNORMAL
BACTERIA UR CULT: ABNORMAL

## 2017-12-04 ENCOUNTER — GENERIC CONVERSION - ENCOUNTER (OUTPATIENT)
Dept: OTHER | Facility: OTHER | Age: 77
End: 2017-12-04

## 2017-12-11 ENCOUNTER — GENERIC CONVERSION - ENCOUNTER (OUTPATIENT)
Dept: OTHER | Facility: OTHER | Age: 77
End: 2017-12-11

## 2018-01-09 ENCOUNTER — GENERIC CONVERSION - ENCOUNTER (OUTPATIENT)
Dept: CARDIOLOGY CLINIC | Facility: CLINIC | Age: 78
End: 2018-01-09

## 2018-01-10 NOTE — MISCELLANEOUS
Message   Recorded as Task   Date: 08/14/2017 10:56 AM, Created By: Rhona Gallardo   Task Name: Call Back   Assigned To: Alfredo HightowerB,TEAM   Regarding Patient: Denton Llanes, Status: Complete   Comment:    Leora Hummel - 14 Aug 2017 10:56 AM     TASK CREATED  [address] home asking if a voiding trial can be done for patient  Would need orders  Patient was apparently hospitalized at [address] [place] [address] [address] for a stroke recently, caller very unsure of details(she is filling in for normal nurse) Please call [phone]  Dari Avilez - 14 Aug 2017 11:29 AM     TASK EDITED  LMOM PER PA-C VOIDING TRIAL, IF UNABLE TO VOID WITHIN 6 HRS CATH TO BE REPLACED  Dari Avilez - 14 Aug 2017 11:30 AM     TASK COMPLETED        Active Problems    1   Calculus of kidney (592 0) (N20 0)    Signatures   Electronically signed by : Crow Mazariegos, ; Aug 14 2017 11:45AM EST                       (Author)

## 2018-01-10 NOTE — MISCELLANEOUS
Message   Recorded as Task   Date: 08/15/2017 11:12 AM, Created By: Emiliano Oppenheim   Task Name: Medical Complaint Callback   Assigned To: [place] [facility],TEAM   Regarding Patient: Denton Llanes, Status: Active   CommentAcey Grace - 15 Aug 2017 11:12 AM     TASK CREATED  Caller: RILEY; Medical Complaint; [phone]  [address] SAYING THE PATIENT PULLED OUT HER OWN CATH LAST NIGHT/EARLY MORNING  THEY HAD TO PUT ANOTHER CATH IN  THEY WERE SUPPOSE TO DO A VOIDING TRIAL TODAY BUT NOW SHE IS NOT SURE WHEN SHE SHOULD DO THAT OR IF THE PATIENT SHOULD BE SEEN IN THE OFFICE  Rainer Valles - 15 Aug 2017 11:22 AM     TASK EDITED  Shobha Berg notified to do the voiding trial tomorrow 08/16/17  Active Problems    1   Calculus of kidney (592 0) (N20 0)    Signatures   Electronically signed by : Doyle Gonsales, ; Aug 15 2017 11:22AM EST                       (Author)

## 2018-01-11 NOTE — MISCELLANEOUS
Message   Recorded as Task   Date: 10/24/2017 08:09 AM, Created By: Suki Berry   Task Name: Call Back   Assigned To: Alfredo OSORIO,TEAM   Regarding Patient: Mercy Vargas, Status: Active   Comment:    Suki Berry - 24 Oct 2017 8:09 AM     TASK CREATED  Caller: Self; [phone] (Home)  [facility], pt has blood in urine with a moderate amount of mucus  Please advise [phone] - nurse Sera Rodríguez - 24 Oct 2017 8:58 AM     TASK EDITED  PT  HAD ONE INCIDENT WERE THERE WAS HEMATURIA, BUT PT  HAS NO PAIN, FEVER, OR CHILLS  PT  HOME INSTRUCTED TO INCREASE FLUIDS  Active Problems    1  Arthritis (716 90) (M19 90)   2  Atrial fibrillation, unspecified type (427 31) (I48 91)   3  Calculus of ureter (592 1) (N20 1)   4  History of renal calculi (V13 01) (Z87 442)   5  Hypertension, unspecified type (401 9) (I10)   6  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)   7  Morbid obesity, unspecified obesity type (278 01) (E66 01)    Current Meds   1  Acetaminophen 325 MG Oral Tablet; Therapy: (Recorded:13Sep2017) to Recorded   2  Albuterol Sulfate (2 5 MG/3ML) 0 083% Inhalation Nebulization Solution; Therapy: (Recorded:13Sep2017) to Recorded   3  Allegra 180 MG TABS (Fexofenadine HCl); Therapy: (Recorded:85Wzw0599) to Recorded   4  AmLODIPine Besylate 5 MG Oral Tablet; Therapy: (Recorded:18Yrx0596) to Recorded   5  Clopidogrel Bisulfate 75 MG Oral Tablet; Therapy: (Recorded:80Huc4439) to Recorded   6  Daily Multivitamin TABS; Therapy: (Recorded:07Ilr0922) to Recorded   7  Ditropan 5 MG TABS; Therapy: (Recorded:65Nke9070) to Recorded   8  Dulcolax 10 MG Rectal Suppository; Therapy: (Recorded:12Ugm3928) to Recorded   9  Fleet Enema 7-19 GM/118ML Rectal Enema; Therapy: (Recorded:66Kla9210) to Recorded   10  Losartan Potassium 50 MG Oral Tablet; Therapy: (Recorded:92Zhs6284) to Recorded   11  Melatonin 3 MG Oral Tablet;     Therapy: (Recorded:76Gsd4887) to Recorded   12  Metoprolol Succinate ER 25 MG Oral Tablet Extended Release 24 Hour; Therapy: (Recorded:13Sep2017) to Recorded   13  Milk of Magnesia 400 MG/5ML Oral Suspension; Therapy: (Recorded:13Sep2017) to Recorded   14  Neurontin 100 MG Oral Capsule (Gabapentin); Therapy: (Recorded:13Sep2017) to Recorded   15  OLANZapine 5 MG Oral Tablet; Therapy: (Recorded:13Sep2017) to Recorded   16  Optive 0 5-0 9 % SOLN;    Therapy: (Recorded:13Sep2017) to Recorded   17  Oxybutynin Chloride 5 MG Oral Tablet; Therapy: (Recorded:13Sep2017) to Recorded   18  Senna 8 6 MG Oral Tablet; Therapy: (Recorded:13Sep2017) to Recorded   19  Sertraline HCl - 100 MG Oral Tablet; Therapy: (Recorded:13Sep2017) to Recorded   20  Vitamin C 250 MG Oral Tablet; Therapy: (Recorded:13Sep2017) to Recorded   21  Vitamin D3 1000 UNIT Oral Capsule; Therapy: (Recorded:13Sep2017) to Recorded    Allergies    1  Alendronate Sodium TABS   2  butorphanol   3  cephalexin   4  Clindamycin   5  epinephrine   6  erythromycin   7  Fosamax   8  omeprazole   9  Percodan   10  Vancomycin    11   Other    Signatures   Electronically signed by : Dewayne Stubbs, ; Oct 24 2017  8:58AM EST                       (Author)

## 2018-01-12 NOTE — MISCELLANEOUS
Message   Recorded as Task   Date: 09/13/2017 10:06 AM, Created By: James Cortes   Task Name: Call Back   Assigned To: Alfredo HightowerB,TEAM   Regarding Patient: Karthikeyan Holland, Status: Active   Comment:    Isaura Garcia - 13 Sep 2017 10:06 AM     TASK CREATED  Caller: Lucila Gutierrez; Other  JAMIE FROM THE [address] IN [place] CALLED SAYING PATIENT HAS MORE BLOOD IN THE URINE BUT NO FEVER AND NO CLOTS  [phone]   Tatyana Mancera - 13 Sep 2017 10:15 AM     TASK EDITED  PT HAS SENT, URINE PINK COLOR  INSTRUCTED TO INCREASE FLUIDS KEEP APPT FOR 9/15  Active Problems    1  History of renal calculi (V13 01) (Z87 442)    Current Meds   1  Acetaminophen 325 MG Oral Tablet; Therapy: (Recorded:16Lcw7532) to Recorded   2  Albuterol Sulfate (2 5 MG/3ML) 0 083% Inhalation Nebulization Solution; Therapy: (Recorded:13Sep2017) to Recorded   3  Allegra 180 MG TABS (Fexofenadine HCl); Therapy: (Recorded:06Zjr6745) to Recorded   4  AmLODIPine Besylate 5 MG Oral Tablet; Therapy: (Recorded:25Arc3690) to Recorded   5  Clopidogrel Bisulfate 75 MG Oral Tablet; Therapy: (Recorded:22Ryn1108) to Recorded   6  Daily Multivitamin TABS; Therapy: (Recorded:14Zki9324) to Recorded   7  Ditropan 5 MG TABS; Therapy: (Recorded:60Gvk5827) to Recorded   8  Dulcolax 10 MG Rectal Suppository; Therapy: (Recorded:99Avg0792) to Recorded   9  Fleet Enema 7-19 GM/118ML Rectal Enema; Therapy: (Recorded:11Xou2050) to Recorded   10  Losartan Potassium 50 MG Oral Tablet; Therapy: (Recorded:23Jxa6257) to Recorded   11  Melatonin 3 MG Oral Tablet; Therapy: (Recorded:89Rzu3194) to Recorded   12  Metoprolol Succinate ER 25 MG Oral Tablet Extended Release 24 Hour; Therapy: (Recorded:67Hde8110) to Recorded   13  Milk of Magnesia 400 MG/5ML Oral Suspension; Therapy: (Recorded:90Tph0988) to Recorded   14  Neurontin 100 MG Oral Capsule (Gabapentin); Therapy: (Recorded:13Vjh1794) to Recorded   15   OLANZapine 5 MG Oral Tablet; Therapy: (Recorded:13Sep2017) to Recorded   16  Optive 0 5-0 9 % SOLN;    Therapy: (Recorded:13Sep2017) to Recorded   17  Oxybutynin Chloride 5 MG Oral Tablet; Therapy: (Recorded:13Sep2017) to Recorded   18  Senna 8 6 MG Oral Tablet; Therapy: (Recorded:13Sep2017) to Recorded   19  Sertraline HCl - 100 MG Oral Tablet; Therapy: (Recorded:13Sep2017) to Recorded   20  Vitamin C 250 MG Oral Tablet; Therapy: (Recorded:13Sep2017) to Recorded   21  Vitamin D3 1000 UNIT Oral Capsule; Therapy: (Recorded:13Sep2017) to Recorded    Allergies    1  Alendronate Sodium TABS   2  butorphanol   3  cephalexin   4  Clindamycin   5  epinephrine   6  erythromycin   7  Fosamax   8  omeprazole   9  Percodan   10  Vancomycin    11   Other    Signatures   Electronically signed by : Jannie Swift RN; Sep 13 2017 10:16AM EST                       (Author)

## 2018-01-12 NOTE — MISCELLANEOUS
Message   Recorded as Task   Date: 09/20/2017 02:51 PM, Created By: Inocencia Allen   Task Name: Care Coordination   Assigned To: Alfredo OSORIO,TEAM   Regarding Patient: Ellen Franklin, Status: Active   Comment:    Inocencia Allen - 20 Sep 2017 2:51 PM     TASK CREATED  TELL NURSING HOME TO START DOXYCYCLINE 100 MG BID, 3 DAYS BEFORE PROCEDURE ([address] OCT 3, STENT CHANGE)   Tatyana Mancera - 20 Sep 2017 3:08 PM     TASK EDITED  SPOKE TO [address] AT [facility], [facility] [phone]  [phone]  Active Problems    1  History of renal calculi (V13 01) (Z87 167)    Current Meds   1  Acetaminophen 325 MG Oral Tablet; Therapy: (Recorded:16Wvl7886) to Recorded   2  Albuterol Sulfate (2 5 MG/3ML) 0 083% Inhalation Nebulization Solution; Therapy: (Recorded:20Uig3141) to Recorded   3  Allegra 180 MG TABS (Fexofenadine HCl); Therapy: (Recorded:74Joh5764) to Recorded   4  AmLODIPine Besylate 5 MG Oral Tablet; Therapy: (Recorded:58Tlf8596) to Recorded   5  Clopidogrel Bisulfate 75 MG Oral Tablet; Therapy: (Recorded:71Fpr1078) to Recorded   6  Daily Multivitamin TABS; Therapy: (Recorded:66Gas1419) to Recorded   7  Ditropan 5 MG TABS; Therapy: (Recorded:38Elg1039) to Recorded   8  Dulcolax 10 MG Rectal Suppository; Therapy: (Recorded:12Tmi9606) to Recorded   9  Fleet Enema 7-19 GM/118ML Rectal Enema; Therapy: (Recorded:39Cwb5113) to Recorded   10  Losartan Potassium 50 MG Oral Tablet; Therapy: (Recorded:36Zou9518) to Recorded   11  Melatonin 3 MG Oral Tablet; Therapy: (Recorded:99Vjf1321) to Recorded   12  Metoprolol Succinate ER 25 MG Oral Tablet Extended Release 24 Hour; Therapy: (Recorded:06Ssw5426) to Recorded   13  Milk of Magnesia 400 MG/5ML Oral Suspension; Therapy: (Recorded:68Kru6062) to Recorded   14  Neurontin 100 MG Oral Capsule (Gabapentin); Therapy: (Recorded:40Kgb8887) to Recorded   15  OLANZapine 5 MG Oral Tablet;     Therapy: (Recorded:13Sep2017) to Recorded   16  Optive 0 5-0 9 % SOLN;    Therapy: (Recorded:13Sep2017) to Recorded   17  Oxybutynin Chloride 5 MG Oral Tablet; Therapy: (Recorded:13Sep2017) to Recorded   18  Senna 8 6 MG Oral Tablet; Therapy: (Recorded:13Sep2017) to Recorded   19  Sertraline HCl - 100 MG Oral Tablet; Therapy: (Recorded:13Sep2017) to Recorded   20  Vitamin C 250 MG Oral Tablet; Therapy: (Recorded:13Sep2017) to Recorded   21  Vitamin D3 1000 UNIT Oral Capsule; Therapy: (Recorded:13Sep2017) to Recorded    Allergies    1  Alendronate Sodium TABS   2  butorphanol   3  cephalexin   4  Clindamycin   5  epinephrine   6  erythromycin   7  Fosamax   8  omeprazole   9  Percodan   10  Vancomycin    11   Other    Signatures   Electronically signed by : Antonio Dunne RN; Sep 20 2017  3:08PM EST                       (Author)

## 2018-01-13 NOTE — MISCELLANEOUS
Message   Recorded as Task   Date: 09/20/2017 02:51 PM, Created By: Inocencia King   Task Name: Care Coordination   Assigned To: Alfredo OSORIO,TEAM   Regarding Patient: Gilma Peralta, Status: Active   Comment:    Inocencia King - 20 Sep 2017 2:51 PM     TASK CREATED  TELL NURSING HOME TO START DOXYCYCLINE 100 MG BID, 3 DAYS BEFORE PROCEDURE ([address] OCT 3, STENT CHANGE)   Tatyana Mancera - 20 Sep 2017 3:08 PM     TASK EDITED  SPOKE TO [address] AT [facility], [facility] [phone]  [phone]  Tatyana Mancera - 20 Sep 2017 3:08 PM     TASK COMPLETED   Lizzie Luke - 22 Sep 2017 3:30 PM     TASK REACTIVATED   [facility][note 3] calling for verbal order states did not get faxed order   [phone] ask for Keiry Dose - 22 Sep 2017 3:36 PM     TASK EDITED  VERBAL FOR DOXYCYCLINE 100 MG BID TO START 3 DAYS PRIOR TO OCT 3 PROCEDURE GIVEN TO AIDEN  Active Problems    1  History of renal calculi (V13 01) (Z87 442)    Current Meds   1  Acetaminophen 325 MG Oral Tablet; Therapy: (Recorded:13Sep2017) to Recorded   2  Albuterol Sulfate (2 5 MG/3ML) 0 083% Inhalation Nebulization Solution; Therapy: (Recorded:97Kxd1024) to Recorded   3  Allegra 180 MG TABS (Fexofenadine HCl); Therapy: (Recorded:05Whe8041) to Recorded   4  AmLODIPine Besylate 5 MG Oral Tablet; Therapy: (Recorded:72Hjs4238) to Recorded   5  Clopidogrel Bisulfate 75 MG Oral Tablet; Therapy: (Recorded:01Bjg7485) to Recorded   6  Daily Multivitamin TABS; Therapy: (Recorded:84Ytm4462) to Recorded   7  Ditropan 5 MG TABS; Therapy: (Recorded:30Ehy6538) to Recorded   8  Dulcolax 10 MG Rectal Suppository; Therapy: (Recorded:48Qmy1096) to Recorded   9  Fleet Enema 7-19 GM/118ML Rectal Enema; Therapy: (Recorded:78Jxq2538) to Recorded   10  Losartan Potassium 50 MG Oral Tablet; Therapy: (Recorded:09Orf3006) to Recorded   11  Melatonin 3 MG Oral Tablet; Therapy: (Recorded:30Kiw4687) to Recorded   12   Metoprolol Succinate ER 25 MG Oral Tablet Extended Release 24 Hour; Therapy: (Recorded:13Sep2017) to Recorded   13  Milk of Magnesia 400 MG/5ML Oral Suspension; Therapy: (Recorded:13Sep2017) to Recorded   14  Neurontin 100 MG Oral Capsule (Gabapentin); Therapy: (Recorded:13Sep2017) to Recorded   15  OLANZapine 5 MG Oral Tablet; Therapy: (Recorded:13Sep2017) to Recorded   16  Optive 0 5-0 9 % SOLN;    Therapy: (Recorded:13Sep2017) to Recorded   17  Oxybutynin Chloride 5 MG Oral Tablet; Therapy: (Recorded:13Sep2017) to Recorded   18  Senna 8 6 MG Oral Tablet; Therapy: (Recorded:13Sep2017) to Recorded   19  Sertraline HCl - 100 MG Oral Tablet; Therapy: (Recorded:13Sep2017) to Recorded   20  Vitamin C 250 MG Oral Tablet; Therapy: (Recorded:13Sep2017) to Recorded   21  Vitamin D3 1000 UNIT Oral Capsule; Therapy: (Recorded:13Sep2017) to Recorded    Allergies    1  Alendronate Sodium TABS   2  butorphanol   3  cephalexin   4  Clindamycin   5  epinephrine   6  erythromycin   7  Fosamax   8  omeprazole   9  Percodan   10  Vancomycin    11   Other    Signatures   Electronically signed by : Gale Heimlich, RN; Sep 22 2017  3:36PM EST                       (Author)

## 2018-01-14 NOTE — MISCELLANEOUS
Message   Recorded as Task   Date: 08/14/2017 10:56 AM, Created By: Erlinda Fuentes   Task Name: Call Back   Assigned To: Alfredo HightowerB,TEAM   Regarding Patient: Fanny Cook, Status: Active   Comment:    Leora Hummel - 14 Aug 2017 10:56 AM     TASK CREATED  [address] home asking if a voiding trial can be done for patient  Would need orders  Patient was apparently hospitalized [facility] for a stroke recently, caller very unsure of details(she is filling in for normal nurse) Please call [phone]  Dari Avilez - 14 Aug 2017 11:29 AM     TASK EDITED  LMOM PER PA-C VOIDING TRIAL, IF UNABLE TO VOID WITHIN 6 HRS CATH TO BE REPLACED  ENTERED INCORRECT AUTHOR (MINOR ANDERSON)1        1 Amended By: Sue Monreal; Aug 14 2017 11:47 AM EST    Active Problems   1   Calculus of kidney (592 0) (N20 0)    Signatures   Electronically signed by : Wiliam Jordan, ; Aug 14 2017 11:48AM EST                       (Author)

## 2018-01-15 NOTE — MISCELLANEOUS
Message   Recorded as Task   Date: 11/22/2017 02:52 PM, Created By: Dorian Ariza   Task Name: Miscellaneous   Assigned To: Alfredo OSORIO,TEAM   Regarding Patient: Ana Cai, Status: In Progress   Comment:    Lizzie Luke - 22 Nov 2017 2:52 PM     TASK CREATED  Caller: Valene Primrose; [phone]  [address] [place] [address] called to inform Dr Ric gramajo to [facility], fever ,kidney injury  Anna Buchanan - 22 Nov 2017 3:01 PM     TASK IN PROGRESS   Anna Buchanan - 22 Nov 2017 3:03 PM     TASK EDITED  Spoke with Hernandez from [address] [place] [address]; Let her know that Dr Lura Holstein has been made aware of the patient's situation  Active Problems    1  Arthritis (716 90) (M19 90)   2  Atrial fibrillation, unspecified type (427 31) (I48 91)   3  Calculus of ureter (592 1) (N20 1)   4  History of renal calculi (V13 01) (Z87 442)   5  Hypertension, unspecified type (401 9) (I10)   6  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)   7  Morbid obesity, unspecified obesity type (278 01) (E66 01)    Current Meds   1  Acetaminophen 325 MG Oral Tablet; Therapy: (Recorded:45Uka4403) to Recorded   2  Albuterol Sulfate (2 5 MG/3ML) 0 083% Inhalation Nebulization Solution; Therapy: (Recorded:58Inn6361) to Recorded   3  Allegra 180 MG TABS (Fexofenadine HCl); Therapy: (Recorded:26Rmu2767) to Recorded   4  AmLODIPine Besylate 5 MG Oral Tablet; Therapy: (Recorded:04Khd5737) to Recorded   5  Clopidogrel Bisulfate 75 MG Oral Tablet; Therapy: (Recorded:91Jls3577) to Recorded   6  Daily Multivitamin TABS; Therapy: (Recorded:47Mbv0758) to Recorded   7  Ditropan 5 MG TABS; Therapy: (Recorded:56Dry1291) to Recorded   8  Dulcolax 10 MG Rectal Suppository; Therapy: (Recorded:55Pzl9521) to Recorded   9  Fleet Enema 7-19 GM/118ML Rectal Enema; Therapy: (Recorded:90Viz2842) to Recorded   10  Losartan Potassium 50 MG Oral Tablet; Therapy: (Recorded:13Sep2017) to Recorded   11   Melatonin 3 MG Oral Tablet; Therapy: (Recorded:46Gne9692) to Recorded   12  Metoprolol Succinate ER 25 MG Oral Tablet Extended Release 24 Hour; Therapy: (Recorded:13Sep2017) to Recorded   13  Milk of Magnesia 400 MG/5ML Oral Suspension; Therapy: (Recorded:13Sep2017) to Recorded   14  Neurontin 100 MG Oral Capsule (Gabapentin); Therapy: (Recorded:13Sep2017) to Recorded   15  OLANZapine 5 MG Oral Tablet; Therapy: (Recorded:13Sep2017) to Recorded   16  Optive 0 5-0 9 % SOLN;    Therapy: (Recorded:13Sep2017) to Recorded   17  Oxybutynin Chloride 5 MG Oral Tablet; Therapy: (Recorded:13Sep2017) to Recorded   18  Senna 8 6 MG Oral Tablet; Therapy: (Recorded:13Sep2017) to Recorded   19  Sertraline HCl - 100 MG Oral Tablet; Therapy: (Recorded:13Sep2017) to Recorded   20  Vitamin C 250 MG Oral Tablet; Therapy: (Recorded:13Sep2017) to Recorded   21  Vitamin D3 1000 UNIT Oral Capsule; Therapy: (Recorded:13Sep2017) to Recorded    Allergies    1  Alendronate Sodium TABS   2  butorphanol   3  cephalexin   4  Clindamycin   5  epinephrine   6  erythromycin   7  Fosamax   8  omeprazole   9  Percodan   10  Vancomycin    11   Other    Signatures   Electronically signed by : Trent Alvarez, ; Nov 22 2017  3:03PM EST                       (Author)

## 2018-01-16 NOTE — MISCELLANEOUS
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)   7  Morbid obesity, unspecified obesity type (278 01) (E66 01)    Current Meds   1  Acetaminophen 325 MG Oral Tablet; Therapy: (Recorded:86Ltz1603) to Recorded   2  Albuterol Sulfate (2 5 MG/3ML) 0 083% Inhalation Nebulization Solution; Therapy: (Recorded:84Zdv0299) to Recorded   3  Allegra 180 MG TABS (Fexofenadine HCl); Therapy: (Recorded:20Ywm7859) to Recorded   4  AmLODIPine Besylate 5 MG Oral Tablet; Therapy: (Recorded:65Yah4776) to Recorded   5  Clopidogrel Bisulfate 75 MG Oral Tablet; Therapy: (Recorded:21Ijm1087) to Recorded   6  Daily Multivitamin TABS; Therapy: (Recorded:20Wbu5503) to Recorded   7  Ditropan 5 MG TABS; Therapy: (Recorded:82Eov0621) to Recorded   8  Dulcolax 10 MG Rectal Suppository; Therapy: (Recorded:13Oio5125) to Recorded   9  Fleet Enema 7-19 GM/118ML Rectal Enema; Therapy: (Recorded:76Mqa1717) to Recorded   10  Losartan Potassium 50 MG Oral Tablet; Therapy: (Recorded:94Xxx8497) to Recorded   11  Melatonin 3 MG Oral Tablet; Therapy: (Recorded:18Kku9888) to Recorded   12  Metoprolol Succinate ER 25 MG Oral Tablet Extended Release 24 Hour; Therapy: (Recorded:32Ctz0191) to Recorded   13  Milk of Magnesia 400 MG/5ML Oral Suspension; Therapy: (Recorded:54Qsa4937) to Recorded   14  Neurontin 100 MG Oral Capsule (Gabapentin); Therapy: (Recorded:89Hgq5763) to Recorded   15  OLANZapine 5 MG Oral Tablet; Therapy: (Recorded:80Ysr3198) to Recorded   16  Optive 0 5-0 9 % SOLN;    Therapy: (Recorded:16Gnm1464) to Recorded   17  Oxybutynin Chloride 5 MG Oral Tablet;     Therapy: (Recorded:70Fhl4226) to Recorded   18  Senna 8 6 MG Oral Tablet; Therapy: (Recorded:54Lyn5181) to Recorded   19  Sertraline HCl - 100 MG Oral Tablet; Therapy: (Recorded:53Svd6461) to Recorded   20  Vitamin C 250 MG Oral Tablet; Therapy: (Recorded:13Sep2017) to Recorded   21  Vitamin D3 1000 UNIT Oral Capsule; Therapy: (Recorded:13Sep2017) to Recorded    Allergies    1  Alendronate Sodium TABS   2  butorphanol   3  cephalexin   4  Clindamycin   5  epinephrine   6  erythromycin   7  Fosamax   8  omeprazole   9  Percodan   10  Vancomycin    11  Other    Plan  Calculus of ureter    · (1) CBC/ PLT (NO DIFF); Status:Active; Requested for:18Oct2017;    · (1) COMPREHENSIVE METABOLIC PANEL; Status:Active;  Requested for:18Oct2017;     Signatures   Electronically signed by : Agustín Louis, ; Oct 18 2017  4:24PM EST                       (Author)

## 2018-01-17 NOTE — MISCELLANEOUS
Dmitry BETH FROM [facility] CALLED RE DOXYCYCLINE 100MG BID TO START 3 DAYS PRIOR TO SURGERY  NEED CLARIFICATION IN HYCLATE OR MONOHYDRATE  WILL DIRECT TO DR David Billy  TO CALL FACILITY BACK [phone]  Active Problems    1  History of renal calculi (V13 01) (Z87 442)    Current Meds   1  Acetaminophen 325 MG Oral Tablet; Therapy: (Recorded:13Sep2017) to Recorded   2  Albuterol Sulfate (2 5 MG/3ML) 0 083% Inhalation Nebulization Solution; Therapy: (Recorded:13Sep2017) to Recorded   3  Allegra 180 MG TABS (Fexofenadine HCl); Therapy: (Recorded:13Sep2017) to Recorded   4  AmLODIPine Besylate 5 MG Oral Tablet; Therapy: (Recorded:13Sep2017) to Recorded   5  Clopidogrel Bisulfate 75 MG Oral Tablet; Therapy: (Recorded:13Sep2017) to Recorded   6  Daily Multivitamin TABS; Therapy: (Recorded:13Sep2017) to Recorded   7  Ditropan 5 MG TABS; Therapy: (Recorded:13Sep2017) to Recorded   8  Dulcolax 10 MG Rectal Suppository; Therapy: (Recorded:13Sep2017) to Recorded   9  Fleet Enema 7-19 GM/118ML Rectal Enema; Therapy: (Recorded:13Sep2017) to Recorded   10  Losartan Potassium 50 MG Oral Tablet; Therapy: (Recorded:13Sep2017) to Recorded   11  Melatonin 3 MG Oral Tablet; Therapy: (Recorded:13Sep2017) to Recorded   12  Metoprolol Succinate ER 25 MG Oral Tablet Extended Release 24 Hour; Therapy: (Recorded:13Sep2017) to Recorded   13  Milk of Magnesia 400 MG/5ML Oral Suspension; Therapy: (Recorded:13Sep2017) to Recorded   14  Neurontin 100 MG Oral Capsule (Gabapentin); Therapy: (Recorded:13Sep2017) to Recorded   15  OLANZapine 5 MG Oral Tablet; Therapy: (Recorded:13Sep2017) to Recorded   16  Optive 0 5-0 9 % SOLN;    Therapy: (Recorded:13Sep2017) to Recorded   17  Oxybutynin Chloride 5 MG Oral Tablet; Therapy: (Recorded:13Sep2017) to Recorded   18  Senna 8 6 MG Oral Tablet; Therapy: (Recorded:13Sep2017) to Recorded   19  Sertraline HCl - 100 MG Oral Tablet;     Therapy: (Recorded:42Bon0772) to Recorded   20  Vitamin C 250 MG Oral Tablet; Therapy: (Recorded:75Asx3163) to Recorded   21  Vitamin D3 1000 UNIT Oral Capsule; Therapy: (Recorded:54Ghy4659) to Recorded    Allergies    1  Alendronate Sodium TABS   2  butorphanol   3  cephalexin   4  Clindamycin   5  epinephrine   6  erythromycin   7  Fosamax   8  omeprazole   9  Percodan   10  Vancomycin    11   Other    Signatures   Electronically signed by : Cintia Burt RN; Sep 26 2017  4:57PM EST                       (Author)

## 2018-01-17 NOTE — MISCELLANEOUS
Message   Recorded as Task   Date: 08/28/2017 10:49 AM, Created By: Kristin Munoz   Task Name: Medical Complaint Callback   Assigned To: Alfredo OSORIO,TEAM   Regarding Patient: Garth Simple, Status: Active   CommentMaebhari Gain - 28 Aug 2017 10:49 AM     TASK CREATED  Caller: Bria Ward; [phone]  Nurse [address] [place] [address] asking to speak with clinical regarding intermittent bleeding,looking for recommendation until next stent exchange   Tatyana Mancera - 28 Aug 2017 2:18 PM     TASK EDITED  Having scant amt of hematuria  To be expected with stent  To call with increase hematuria, clots, fever or urinary symptoms  Maintain adequate fluid intake  Active Problems    1   Calculus of kidney (592 0) (N20 0)    Signatures   Electronically signed by : Polo Edward RN; Aug 28 2017  2:19PM EST                       (Author)

## 2018-01-17 NOTE — PROCEDURES
Procedures by Willis Manning RN at 11/29/2017 10:32 AM      Author:  Willis Manning RN Service:  (none) Author Type:  Registered Nurse    Filed:  11/29/2017 10:35 AM Date of Service:  11/29/2017 10:32 AM Status:  Signed    :  Willis Manning RN (Registered Nurse)        Procedure Orders:       1  Insert PICC line [id number] ordered by Hasmukh Dunne MD at 11/29/17 5497                  Insert PICC  line  Date/Time: 11/29/2017 10:00 AM  Performed by: Alexa Krishnan by: Real Marrow     Patient location:  Bedside  Other Assisting Provider:  No    Consent:     Consent obtained:  Written (Denis Reyes obtained consent )    Consent given by:  Patient  Universal protocol:     Procedure explained and questions answered to patient or proxy's satisfaction: yes      Relevant documents present and verified: yes      Test results available and properly labeled: yes       Imaging studies available: yes      Required blood products, implants, devices, and special equipment available: yes      Site/side marked: yes      Immediately prior to procedure, a time out was called: yes      Patient identity confirmed:  Verbally with patient and arm band  Pre-procedure details:     Hand hygiene: Hand hygiene performed prior to insertion      Sterile barrier technique: All elements of maximal sterile technique followed      Skin preparation:  ChloraPrep    Skin preparation agent: Skin preparation agent completely dried prior to procedure    Indications:     PICC line indications: long term antibiotics    Anesthesia (see MAR for exact dosages):      Anesthesia method:  None (pt has allergy to lidocaine )  Procedure details:     Location:  Basilic    Vessel type: vein      Laterality:  Right    Approach: percutaneous technique used      Patient position:  Flat    Procedural supplies:  Double lumen    Catheter size:  5 Fr    Landmarks identified: yes      Ultrasound guidance: yes      Sterile ultrasound techniques: Sterile gel and sterile probe covers were used      Number of attempts:  1    Successful placement: yes      Vessel of catheter tip end:  Chest Xray needed to confirm placement    Total catheter length (cm):  47    Catheter out on skin (cm):  0    Max flow rate:  999ml/hr     Arm circumference:  29  Post-procedure details:     Post-procedure:  Securement device placed and dressing applied    Assessment:  Blood return through all ports and placement verification pending x-ray result    Post-procedure complications: none      Patient tolerance of procedure:   Tolerated well, no immediate complications  Comments:      LOt # [id number] EXP 10-31-18                     Received for:Provider  EPIC   Nov 29 2017 10:36AM [facility] Standard Time

## 2018-01-23 ENCOUNTER — GENERIC CONVERSION - ENCOUNTER (OUTPATIENT)
Dept: OTHER | Facility: OTHER | Age: 78
End: 2018-01-23

## 2018-01-23 NOTE — MISCELLANEOUS
Message   Recorded as Task   Date: 12/04/2017 10:16 AM, Created By: Matteo Alcantar   Task Name: Call Back   Assigned To: Alfredo OSORIO,TEAM   Regarding Patient: Daphnie Maier, Status: Active   CommentEarthmaxi Simpson - 04 Dec 2017 10:16 AM     TASK CREATED  Caller: Giselle Courser; [phone]  [address] [place] [address] calling for hfTatyana Ureña - 04 Dec 2017 10:24 AM     TASK EDITED  APPT 1/15/18 TO SCHED SURGERY FOR 3 MONTH STENT EXCHANGE  Active Problems    1  Arthritis (716 90) (M19 90)   2  Atrial fibrillation, unspecified type (427 31) (I48 91)   3  Calculus of ureter (592 1) (N20 1)   4  History of renal calculi (V13 01) (Z87 442)   5  Hypertension, unspecified type (401 9) (I10)   6  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)   7  Morbid obesity, unspecified obesity type (278 01) (E66 01)    Current Meds   1  Acetaminophen 325 MG Oral Tablet; Therapy: (Recorded:58Bto7190) to Recorded   2  Albuterol Sulfate (2 5 MG/3ML) 0 083% Inhalation Nebulization Solution; Therapy: (Recorded:76Yjv3355) to Recorded   3  Allegra 180 MG TABS (Fexofenadine HCl); Therapy: (Recorded:36Ikn2224) to Recorded   4  AmLODIPine Besylate 5 MG Oral Tablet; Therapy: (Recorded:13Kdc5693) to Recorded   5  Clopidogrel Bisulfate 75 MG Oral Tablet; Therapy: (Recorded:57Fov0306) to Recorded   6  Daily Multivitamin TABS; Therapy: (Recorded:63Wvh4455) to Recorded   7  Ditropan 5 MG TABS; Therapy: (Recorded:90Rwm3021) to Recorded   8  Dulcolax 10 MG Rectal Suppository; Therapy: (Recorded:63Xeo8822) to Recorded   9  Fleet Enema 7-19 GM/118ML Rectal Enema; Therapy: (Recorded:85Ycr8938) to Recorded   10  Losartan Potassium 50 MG Oral Tablet; Therapy: (Recorded:15Cbo6738) to Recorded   11  Melatonin 3 MG Oral Tablet; Therapy: (Recorded:20Mif7020) to Recorded   12  Metoprolol Succinate ER 25 MG Oral Tablet Extended Release 24 Hour; Therapy: (Recorded:83Lug3068) to Recorded   13   Milk of Magnesia 400 MG/5ML Oral Suspension; Therapy: (Recorded:13Sep2017) to Recorded   14  Neurontin 100 MG Oral Capsule (Gabapentin); Therapy: (Recorded:13Sep2017) to Recorded   15  OLANZapine 5 MG Oral Tablet; Therapy: (Recorded:13Sep2017) to Recorded   16  Optive 0 5-0 9 % SOLN;    Therapy: (Recorded:13Sep2017) to Recorded   17  Oxybutynin Chloride 5 MG Oral Tablet; Therapy: (Recorded:13Sep2017) to Recorded   18  Senna 8 6 MG Oral Tablet; Therapy: (Recorded:13Sep2017) to Recorded   19  Sertraline HCl - 100 MG Oral Tablet; Therapy: (Recorded:13Sep2017) to Recorded   20  Vitamin C 250 MG Oral Tablet; Therapy: (Recorded:13Sep2017) to Recorded   21  Vitamin D3 1000 UNIT Oral Capsule; Therapy: (Recorded:13Sep2017) to Recorded    Allergies    1  Alendronate Sodium TABS   2  butorphanol   3  cephalexin   4  Clindamycin   5  epinephrine   6  erythromycin   7  Fosamax   8  omeprazole   9  Percodan   10  Vancomycin    11   Other    Signatures   Electronically signed by : Cipriano Burton RN; Dec  4 2017 10:25AM EST                       (Author)

## 2018-01-23 NOTE — MISCELLANEOUS
Message   Recorded as Task   Date: 12/11/2017 12:07 PM, Created By: Skip Joshi   Task Name: Medical Complaint Callback   Assigned To: Alfredo OSORIO,TEAM   Regarding Patient: Nuzhat Moran, Status: Active   CommentDelbert Singh - 11 Dec 2017 12:07 PM     TASK CREATED  Caller: Sol Rhoades, Other; [phone]  [address] [place] [address] calling to inform office pt has been having ongoing hematuria since stent exchange, no clots no fever  FiordalizaTatyana bojorquez - 11 Dec 2017 12:26 PM     TASK EDITED  PT STARTED WITH FRESH BLEEDING OVER WEEKEND  NO URINARY SYMPTOMS, NO CLOTS  REMAINS ON PLAVIX  FACILITY WILL INCREASE FLUID INTAKE AND CALL OFFICE IF PERSISTS OR DEV CLOTS  PT HAD STENT EXCHANGE END OF NOV  Active Problems    1  Arthritis (716 90) (M19 90)   2  Atrial fibrillation, unspecified type (427 31) (I48 91)   3  Calculus of ureter (592 1) (N20 1)   4  History of renal calculi (V13 01) (Z87 442)   5  Hypertension, unspecified type (401 9) (I10)   6  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)   7  Morbid obesity, unspecified obesity type (278 01) (E66 01)    Current Meds   1  Acetaminophen 325 MG Oral Tablet; Therapy: (Recorded:72Hqa1999) to Recorded   2  Albuterol Sulfate (2 5 MG/3ML) 0 083% Inhalation Nebulization Solution; Therapy: (Recorded:76Dht7584) to Recorded   3  Allegra 180 MG TABS (Fexofenadine HCl); Therapy: (Recorded:23Psz4175) to Recorded   4  AmLODIPine Besylate 5 MG Oral Tablet; Therapy: (Recorded:20Ztj1263) to Recorded   5  Clopidogrel Bisulfate 75 MG Oral Tablet; Therapy: (Recorded:31Rkn6318) to Recorded   6  Daily Multivitamin TABS; Therapy: (Recorded:67Ssi0198) to Recorded   7  Ditropan 5 MG TABS; Therapy: (Recorded:05Pfx3240) to Recorded   8  Dulcolax 10 MG Rectal Suppository; Therapy: (Recorded:71Dnd2808) to Recorded   9  Fleet Enema 7-19 GM/118ML Rectal Enema; Therapy: (Recorded:84Dha4333) to Recorded   10   Losartan Potassium 50 MG Oral Tablet; Therapy: (Recorded:82Kln9776) to Recorded   11  Melatonin 3 MG Oral Tablet; Therapy: (Recorded:62Xwi0556) to Recorded   12  Metoprolol Succinate ER 25 MG Oral Tablet Extended Release 24 Hour; Therapy: (Recorded:22Zvl2085) to Recorded   13  Milk of Magnesia 400 MG/5ML Oral Suspension; Therapy: (Recorded:22Lyi5544) to Recorded   14  Neurontin 100 MG Oral Capsule (Gabapentin); Therapy: (Recorded:81Nwk8561) to Recorded   15  OLANZapine 5 MG Oral Tablet; Therapy: (Recorded:52Kte4081) to Recorded   16  Optive 0 5-0 9 % SOLN;    Therapy: (Recorded:71Jef5323) to Recorded   17  Oxybutynin Chloride 5 MG Oral Tablet; Therapy: (Recorded:79Csq2261) to Recorded   18  Senna 8 6 MG Oral Tablet; Therapy: (Recorded:63Lui7288) to Recorded   19  Sertraline HCl - 100 MG Oral Tablet; Therapy: (Recorded:71Cgm6056) to Recorded   20  Vitamin C 250 MG Oral Tablet; Therapy: (Recorded:94Qql0267) to Recorded   21  Vitamin D3 1000 UNIT Oral Capsule; Therapy: (Recorded:04Thc6786) to Recorded    Allergies    1  Alendronate Sodium TABS   2  butorphanol   3  cephalexin   4  Clindamycin   5  epinephrine   6  erythromycin   7  Fosamax   8  omeprazole   9  Percodan   10  Vancomycin    11   Other    Signatures   Electronically signed by : Malu Orozco RN; Dec 11 2017 12:26PM EST                       (Author)

## 2018-01-24 ENCOUNTER — TELEPHONE (OUTPATIENT)
Dept: UROLOGY | Facility: MEDICAL CENTER | Age: 78
End: 2018-01-24

## 2018-01-24 ENCOUNTER — TELEPHONE (OUTPATIENT)
Dept: UROLOGY | Facility: AMBULATORY SURGERY CENTER | Age: 78
End: 2018-01-24

## 2018-01-24 PROBLEM — N13.2 HYDRONEPHROSIS WITH URINARY OBSTRUCTION DUE TO RENAL CALCULUS: Status: ACTIVE | Noted: 2018-01-24

## 2018-01-24 NOTE — TELEPHONE ENCOUNTER
Spoke to Naun at [facility], pt started with bright re hematuria last night with some confusion  Denies other urinary symptoms, fever, clots  More lucid this am  Dr Kae Ratliff notified  Reviewed CT Scan Pt to be sched for Stent placement and exchange ASAGUILLE Petit notified to maintain fluids for now and call for problems

## 2018-01-24 NOTE — MISCELLANEOUS
Message   Recorded as Task   Date: 01/23/2018 12:09 PM, Created By: Jose Mustafa   Task Name: Call Back   Assigned To: Alfredo OSORIO,TEAM   Regarding Patient: Ana Cai, Status: Active   Comment:    Jose Mustafa - 23 Jan 2018 12:09 PM     TASK CREATED  Patient's son(Addison) called would like to speak with the nurse had some concern about setting up stent exchange he can be reached at [phone]  Tatyana Mancera - 23 Jan 2018 12:40 PM     TASK EDITED  SPOKE TO SON, PT WAS IN [facility], WILL OBTAIN RECORDS FOR DR WALLS  NEXT APPT 2/9 TO SCHED STENT EXCHANGE, CONCERNED MAY BE SCHED TOO FAR OUT AND OTHER STENT MIGHT BECOME ENCRUSTED  WILL DIRECT TO DR Hawk Landaverde RE: TIME FRAME FOR STENT EXCHANGE  Active Problems    1  Arthritis (716 90) (M19 90)   2  Atrial fibrillation, unspecified type (427 31) (I48 91)   3  Calculus of ureter (592 1) (N20 1)   4  History of renal calculi (V13 01) (Z87 442)   5  Hypertension, unspecified type (401 9) (I10)   6  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)   7  Morbid obesity, unspecified obesity type (278 01) (E66 01)    Current Meds   1  Acetaminophen 325 MG Oral Tablet; Therapy: (Recorded:65Kiu2775) to Recorded   2  Albuterol Sulfate (2 5 MG/3ML) 0 083% Inhalation Nebulization Solution; Therapy: (Recorded:97Khk6807) to Recorded   3  Allegra 180 MG TABS (Fexofenadine HCl); Therapy: (Recorded:27Beu2644) to Recorded   4  AmLODIPine Besylate 5 MG Oral Tablet; Therapy: (Recorded:75Ybz9355) to Recorded   5  Clopidogrel Bisulfate 75 MG Oral Tablet; Therapy: (Recorded:02Rvf6576) to Recorded   6  Daily Multivitamin TABS; Therapy: (Recorded:04Had3538) to Recorded   7  Ditropan 5 MG TABS; Therapy: (Recorded:48Qxg0195) to Recorded   8  Dulcolax 10 MG Rectal Suppository; Therapy: (Recorded:03Yav1426) to Recorded   9  Fleet Enema 7-19 GM/118ML Rectal Enema; Therapy: (Recorded:13Sep2017) to Recorded   10   Losartan Potassium 50 MG Oral Tablet; Therapy: (Recorded:13Whw3903) to Recorded   11  Melatonin 3 MG Oral Tablet; Therapy: (Recorded:38Ysx1424) to Recorded   12  Metoprolol Succinate ER 25 MG Oral Tablet Extended Release 24 Hour; Therapy: (Recorded:53Kkk9087) to Recorded   13  Milk of Magnesia 400 MG/5ML Oral Suspension; Therapy: (Recorded:21Xfg7858) to Recorded   14  Neurontin 100 MG Oral Capsule (Gabapentin); Therapy: (Recorded:15Lrt3618) to Recorded   15  OLANZapine 5 MG Oral Tablet; Therapy: (Recorded:19Ywp2409) to Recorded   16  Optive 0 5-0 9 % SOLN;    Therapy: (Recorded:93Svc6231) to Recorded   17  Oxybutynin Chloride 5 MG Oral Tablet; Therapy: (Recorded:71Rfc1565) to Recorded   18  Senna 8 6 MG Oral Tablet; Therapy: (Recorded:02Rmd8270) to Recorded   19  Sertraline HCl - 100 MG Oral Tablet; Therapy: (Recorded:66Lyc9992) to Recorded   20  Vitamin C 250 MG Oral Tablet; Therapy: (Recorded:83Qtx7691) to Recorded   21  Vitamin D3 1000 UNIT Oral Capsule; Therapy: (Recorded:88Gwd2660) to Recorded    Allergies    1  Alendronate Sodium TABS   2  butorphanol   3  cephalexin   4  Clindamycin   5  epinephrine   6  erythromycin   7  Fosamax   8  omeprazole   9  Percodan   10  Vancomycin    11   Other    Signatures   Electronically signed by : Aliyah Lopez RN; Jan 23 2018 12:40PM EST                       (Author)

## 2018-01-25 ENCOUNTER — TELEPHONE (OUTPATIENT)
Dept: UROLOGY | Facility: AMBULATORY SURGERY CENTER | Age: 78
End: 2018-01-25

## 2018-01-25 ENCOUNTER — DOCUMENTATION (OUTPATIENT)
Dept: UROLOGY | Facility: MEDICAL CENTER | Age: 78
End: 2018-01-25

## 2018-01-25 NOTE — PROGRESS NOTES
Spoke to son Rosario Orellana to inform him of mother's upcoming surgery  Pt's appt for 2/9 not needed

## 2018-01-30 RX ORDER — PANTOPRAZOLE SODIUM 20 MG/1
20 TABLET, DELAYED RELEASE ORAL DAILY
COMMUNITY

## 2018-01-30 RX ORDER — KETOCONAZOLE 20 MG/G
1 CREAM TOPICAL DAILY
Status: ON HOLD | COMMUNITY
End: 2018-02-06 | Stop reason: ALTCHOICE

## 2018-01-30 RX ORDER — FERROUS SULFATE 325(65) MG
325 TABLET ORAL EVERY OTHER DAY
COMMUNITY

## 2018-01-30 RX ORDER — ATROPA BELLADONNA AND OPIUM 16.2; 3 MG/1; MG/1
30 SUPPOSITORY RECTAL EVERY 6 HOURS PRN
COMMUNITY

## 2018-01-30 RX ORDER — SILICONES/ADHESIVE TAPE
1 COMBINATION PACKAGE (EA) TOPICAL 2 TIMES DAILY
COMMUNITY

## 2018-02-01 NOTE — H&P
HISTORY AND PHYSICAL  ? ? Patient Name: Sahil Cottrell  Patient MRN: [id number]  Attending Provider: Stacy Mccabe MD  Service: Urology  Chief Complaint   renal calculus disease, hydronephrosis  HPI   Sahil Cottrell is a 66 y o  female with  long history of kidney stones over the past several years  she had numerous ureteroscopy with laser treatments between 2012 in 2016  Stones fragmented well, but never passed  She became obstructed quite quickly with numerous complications of infections, stroke, septic episodes  We have kept her with a internal left stent and change it every eight weeks  see prior notes regarding a second stent in the left ureter that is encrusted and cannot beremoved  Recently she had right-sided discomfort and was found to have on CTscan at [facility] a 1 4 cm right ureteral  stone as well  I plan  cystoscopy, left stent exchange, right retrograde pyelogram and possible right stent placement  Potential risks and complications discussed, and informed consent was given by the patient  patient and her family know that she is high risk due toco existing medical problems, chronic anticoagulation  whwhich she will stay, and prior septic episodes and strokes  Medications  Meds/Allergies     No current facility-administered medications for this encounter  Cannot display prior to admission medications because the patient has not been admitted in this contact  No current facility-administered medications for this encounter       Current Outpatient Prescriptions:     acetaminophen (TYLENOL) 325 mg tablet, Take 650 mg by mouth every 4 (four) hours as needed for mild pain  , Disp: , Rfl:     apixaban (ELIQUIS) 5 mg, Take 5 mg by mouth 2 (two) times a day, Disp: , Rfl:     aspirin (ECOTRIN LOW STRENGTH) 81 mg EC tablet, Take 81 mg by mouth daily, Disp: , Rfl:     bacitracin-polymyxin b (POLYSPORIN) 500-10,000 units/g ointment, Apply topically 2 (two) times a day, Disp: , Rfl:     belladonna-opium (B&O SUPPOSITORY) 16 2-30 mg suppository, Insert 30 mg into the rectum every 6 (six) hours as needed for bladder spasms, Disp: , Rfl:     bisacodyl (DULCOLAX) 10 mg suppository, Insert 10 mg into the rectum daily as needed for constipation, Disp: , Rfl:     Carboxymethylcellul-Glycerin (REFRESH OPTIVE) 0 5-0 9 % SOLN, Apply 1 drop to eye 2 (two) times a day, Disp: , Rfl:     Cholecalciferol (VITAMIN D3) 2000 units TABS, Take 2,000 Units by mouth daily, Disp: , Rfl:     dimethicone (REMEDY NUTRASHIELD) 1 % cream, Apply topically 2 (two) times a day as needed for dry skin, Disp: , Rfl:     ferrous sulfate 325 (65 Fe) mg tablet, Take 325 mg by mouth every other day, Disp: , Rfl:     fexofenadine (ALLEGRA) 60 MG tablet, Take 60 mg by mouth 2 (two) times a day  , Disp: , Rfl:     gabapentin (NEURONTIN) 100 mg capsule, Take 100 mg by mouth 3 (three) times a day, Disp: , Rfl:     ketoconazole (NIZORAL) 2 % cream, Apply topically daily, Disp: , Rfl:     levothyroxine 25 mcg tablet, Take 25 mcg by mouth daily, Disp: , Rfl:     magnesium hydroxide (MILK OF MAGNESIA) 400 mg/5 mL oral suspension, Take 30 mL by mouth daily as needed for constipation, Disp: , Rfl:     Melatonin 3 MG CAPS, Take 3 mg by mouth daily at bedtime, Disp: , Rfl:     Menthol, Topical Analgesic, (BIOFREEZE) 4 % GEL, Apply topically daily at bedtime as needed For shoulder pain  , Disp: , Rfl:     metoprolol tartrate (LOPRESSOR) 12 5 mg tablet, Take 12 5 mg by mouth 2 (two) times a day, Disp: , Rfl:     Multiple Vitamin (MULTIVITAMIN) tablet, Take 1 tablet by mouth daily, Disp: , Rfl:     OLANZAPINE PO, Take 2 5 mg by mouth daily Take 2 5 mg in am and 5 mg at bedtime , Disp: , Rfl:     OXYBUTYNIN CHLORIDE ER PO, Take 5 mg by mouth daily, Disp: , Rfl:     OxyCODONE HCl 5 MG TABA, Take 5 mg by mouth 3 (three) times a day, Disp: , Rfl:     OxyCODONE HCl 5 MG TABA, Take 5 mg by mouth every 4 (four) hours as needed, Disp: , Rfl:     pantoprazole (PROTONIX) 20 mg tablet, Take 20 mg by mouth daily, Disp: , Rfl:     schraggers, Apply 1 application topically 3 (three) times a day Apply to right and left buttocks for excoriation  , Disp: , Rfl:     sertraline (ZOLOFT) 100 mg tablet, Take 100 mg by mouth daily, Disp: , Rfl:     simethicone (MYLICON) 80 mg chewable tablet, Chew 80 mg every 6 (six) hours as needed for flatulence, Disp: , Rfl:     sodium chloride (OCEAN) 0 65 % nasal spray, 1 spray into each nostril as needed  , Disp: , Rfl:     sodium phosphate (FLEET) enema, Insert 1 enema into the rectum every third day as needed follow package directions  , Disp: , Rfl:   Review of Systems  10 point review of systems negative except as noted in HPI  Allergies  Allergies   Allergen Reactions    Clindamycin Hives and Other (See Comments)     Hives in throat      Alendronate Other (See Comments)     throat swells  Extracted from [place] profile- Allergies confirmed from past medical history    unknown    Butorphanol Other (See Comments)     unknown    Cephalosporins Other (See Comments)     unknown    Epinephrine Other (See Comments)     unknown    Erythromycin Other (See Comments)     unknown    Lidocaine      "i get anxiety"    [place] Flavor Other (See Comments)     unknown    Omeprazole Other (See Comments)     unknown    Papaya Derivatives Other (See Comments)     unknown    Vancomycin Other (See Comments)     unknown     PMH  Past Medical History:   Diagnosis Date    Abnormality of gait and mobility     Anemia     Anxiety     Calculus of kidney     Candidiasis of female genitalia     Clostridium difficile infection     History of C-Diff    Constipation     [facility]     Dementia     Dementia with psychosis     Depression     Diabetes mellitus ([facility]     No medications    Dry eye syndrome     Dysphagia     Dysuria     Food allergy matias/papaya    GERD (gastroesophageal reflux disease)     Hand injury     site of mrsa infection- nurse at nursing home denies knowledge of this    Hematuria     Hemiplegia and hemiparesis ([address] )     left    Hyperlipidemia     Hypertension     Hypothyroid     Insomnia     Irregular heart beat     A-fib    Muscle weakness     left side    Nephrolithiasis     Neuropathy     Paroxysmal atrial fibrillation (HCC)     Polyneuropathy     Seasonal allergies     Seasonal allergies     Shortness of breath     Stroke (HCC)     left sided weakness    TIA (transient ischemic attack)     Ureteral stent retained     replacement today 10/3/2017    Urinary incontinence     UTI (urinary tract infection)     Wears dentures     full set    Wears glasses     Wheelchair bound      Past surgical history  Past Surgical History:   Procedure Laterality Date    CARPAL TUNNEL RELEASE Right     CYSTOSCOPY      CYSTOSCOPY W/ RETROGRADES Left 11/28/2017    Procedure: CYSTOSCOPY WITH RETROGRADE PYELOGRAM;  Surgeon: Melissa Jones MD;  [facility];  Service: Urology    HYSTERECTOMY      LITHOTRIPSY      DC CYSTOSCOPY,INSERT URETERAL STENT Left 7/14/2017    Procedure: CYSTOSCOPY, laser bladder and left urethal stone, bilateral retrograde pyelogram, left uteroscopy, and left stent placement;  Surgeon: Melissa Jones MD;  [facility];  Service: Urology    DC CYSTOSCOPY,INSERT URETERAL STENT Left 10/3/2017    Procedure: CYSTOSCOPY, EXCHANGE STENT URETERAL;  Surgeon: Melissa Jones MD;  [facility];  Service: Urology    DC CYSTOSCOPY,INSERT URETERAL STENT Left 11/28/2017    Procedure: EXCHANGE STENT URETERAL;  Surgeon: Melissa Jones MD;  [facility];  Service: Urology    SALPINGOOPHORECTOMY Bilateral     URETEROSCOPY Left 10/3/2017    Procedure: URETEROSCOPY;  Surgeon: Melissa Jones MD;  [facility];  Service: Urology     Social history  Social History   Substance Use Topics  Smoking status: Never Smoker    Smokeless tobacco: Never Used    Alcohol use No     ?  Physical Exam  General appearance: Changes of stroke, come chronic bedridden state, lethargy    Head: Normocephalic, without obvious abnormality, atraumatic  Neck: no adenopathy, no carotid bruit, no JVD, supple, symmetrical, trachea midline and thyroid not enlarged, symmetric, no tenderness/mass/nodules  Lungs: clear to auscultation bilaterally  Heart: regular rate and rhythm, S1, S2 normal, no murmur, click, rub or gallop  Abdomen: soft, non-tender; bowel sounds normal; no masses,  no organomegaly  Extremities: extremities normal, warm and well-perfused; no cyanosis, clubbing, or edema  Pulses: 2+ and symmetric  Neuro neuro exam shows status post stroke with right sided weakness  Chantal Smith MD

## 2018-02-05 ENCOUNTER — TELEPHONE (OUTPATIENT)
Dept: UROLOGY | Facility: AMBULATORY SURGERY CENTER | Age: 78
End: 2018-02-05

## 2018-02-05 ENCOUNTER — ANESTHESIA EVENT (OUTPATIENT)
Dept: PERIOP | Facility: HOSPITAL | Age: 78
End: 2018-02-05
Payer: COMMERCIAL

## 2018-02-05 NOTE — PRE-PROCEDURE INSTRUCTIONS
Pre-Surgery Instructions:   Medication Instructions    acetaminophen (TYLENOL) 325 mg tablet Patient was instructed by Physician and understands   apixaban (ELIQUIS) 5 mg Patient was instructed by Physician and understands   aspirin (ECOTRIN LOW STRENGTH) 81 mg EC tablet Patient was instructed by Physician and understands   bacitracin-polymyxin b (POLYSPORIN) 500-10,000 units/g ointment Patient was instructed by Physician and understands   belladonna-opium (B&O SUPPOSITORY) 16 2-30 mg suppository Patient was instructed by Physician and understands   bisacodyl (DULCOLAX) 10 mg suppository Patient was instructed by Physician and understands   Carboxymethylcellul-Glycerin (REFRESH OPTIVE) 0 5-0 9 % SOLN Patient was instructed by Physician and understands   Cholecalciferol (VITAMIN D3) 2000 units TABS Patient was instructed by Physician and understands   dimethicone (REMEDY NUTRASHIELD) 1 % cream Patient was instructed by Physician and understands   ferrous sulfate 325 (65 Fe) mg tablet Patient was instructed by Physician and understands   fexofenadine (ALLEGRA) 60 MG tablet Patient was instructed by Physician and understands   gabapentin (NEURONTIN) 100 mg capsule Patient was instructed by Physician and understands   ketoconazole (NIZORAL) 2 % cream Patient was instructed by Physician and understands   levothyroxine 25 mcg tablet Patient was instructed by Physician and understands   magnesium hydroxide (MILK OF MAGNESIA) 400 mg/5 mL oral suspension Patient was instructed by Physician and understands   Melatonin 3 MG CAPS Patient was instructed by Physician and understands   Menthol, Topical Analgesic, (BIOFREEZE) 4 % GEL Patient was instructed by Physician and understands   metoprolol tartrate (LOPRESSOR) 12 5 mg tablet Patient was instructed by Physician and understands   Multiple Vitamin (MULTIVITAMIN) tablet Patient was instructed by Physician and understands   OLANZAPINE PO Patient was instructed by Physician and understands   OXYBUTYNIN CHLORIDE ER PO Patient was instructed by Physician and understands   OxyCODONE HCl 5 MG TABA Patient was instructed by Physician and understands   OxyCODONE HCl 5 MG TABA Patient was instructed by Physician and understands   pantoprazole (PROTONIX) 20 mg tablet Patient was instructed by Physician and understands   schraggers Patient was instructed by Physician and understands   sertraline (ZOLOFT) 100 mg tablet Patient was instructed by Physician and understands   simethicone (MYLICON) 80 mg chewable tablet Patient was instructed by Physician and understands   sodium chloride (OCEAN) 0 65 % nasal spray Patient was instructed by Physician and understands   sodium phosphate (FLEET) enema Patient was instructed by Physician and understands  [facility] preop  Instructions faxed to Carlos gonzalez at [facility]  Instructed to have patient take metoprolol, levothyroxine, and pantoprazole morning of surgery with a sip of water and to follow any directions that Dr Mandy Chisholm office provided

## 2018-02-05 NOTE — TELEPHONE ENCOUNTER
I spoke to Zita Farris who stated their 3rd shift had received an arrival time for the patient's 2/6/18 procedure  I advised daniel that the time given would be correct(nursing homes are called 48 hours prior with the arrival time)  I gave Tatiana Cheema's phone number to double check

## 2018-02-05 NOTE — PROGRESS NOTES
Nurse spoke with Brayan Figueroa at Dr Jannette Costa office  Brayan Figueroa confirmed that a letter was sent to [facility] stating not to stop aspirin or eliquis

## 2018-02-06 ENCOUNTER — HOSPITAL ENCOUNTER (OUTPATIENT)
Dept: RADIOLOGY | Facility: HOSPITAL | Age: 78
Setting detail: OUTPATIENT SURGERY
Discharge: HOME | End: 2018-02-06
Payer: COMMERCIAL

## 2018-02-06 ENCOUNTER — HOSPITAL ENCOUNTER (OUTPATIENT)
Facility: HOSPITAL | Age: 78
Setting detail: OUTPATIENT SURGERY
Discharge: NURSING, REHAB OR LONG-TERM CARE | End: 2018-02-06
Attending: UROLOGY | Admitting: UROLOGY
Payer: COMMERCIAL

## 2018-02-06 ENCOUNTER — ANESTHESIA (OUTPATIENT)
Dept: PERIOP | Facility: HOSPITAL | Age: 78
End: 2018-02-06
Payer: COMMERCIAL

## 2018-02-06 VITALS
SYSTOLIC BLOOD PRESSURE: 120 MMHG | RESPIRATION RATE: 18 BRPM | HEART RATE: 78 BPM | HEIGHT: 63 IN | OXYGEN SATURATION: 95 % | WEIGHT: 244.2 LBS | TEMPERATURE: 97.4 F | BODY MASS INDEX: 43.27 KG/M2 | DIASTOLIC BLOOD PRESSURE: 61 MMHG

## 2018-02-06 DIAGNOSIS — N13.2 HYDRONEPHROSIS WITH URINARY OBSTRUCTION DUE TO RENAL CALCULUS: ICD-10-CM

## 2018-02-06 PROCEDURE — 87086 URINE CULTURE/COLONY COUNT: CPT | Performed by: UROLOGY

## 2018-02-06 PROCEDURE — 52332 CYSTOSCOPY AND TREATMENT: CPT | Performed by: UROLOGY

## 2018-02-06 PROCEDURE — C1769 GUIDE WIRE: HCPCS | Performed by: UROLOGY

## 2018-02-06 PROCEDURE — 87186 SC STD MICRODIL/AGAR DIL: CPT | Performed by: UROLOGY

## 2018-02-06 PROCEDURE — 87147 CULTURE TYPE IMMUNOLOGIC: CPT | Performed by: UROLOGY

## 2018-02-06 PROCEDURE — C2625 STENT, NON-COR, TEM W/DEL SY: HCPCS | Performed by: UROLOGY

## 2018-02-06 PROCEDURE — 87077 CULTURE AEROBIC IDENTIFY: CPT | Performed by: UROLOGY

## 2018-02-06 PROCEDURE — 76000 FLUOROSCOPY <1 HR PHYS/QHP: CPT

## 2018-02-06 DEVICE — IMPLANTABLE DEVICE: Type: IMPLANTABLE DEVICE | Site: URETER | Status: FUNCTIONAL

## 2018-02-06 RX ORDER — PROPOFOL 10 MG/ML
INJECTION, EMULSION INTRAVENOUS CONTINUOUS PRN
Status: DISCONTINUED | OUTPATIENT
Start: 2018-02-06 | End: 2018-02-06 | Stop reason: SURG

## 2018-02-06 RX ORDER — FENTANYL CITRATE/PF 50 MCG/ML
25 SYRINGE (ML) INJECTION
Status: DISCONTINUED | OUTPATIENT
Start: 2018-02-06 | End: 2018-02-06 | Stop reason: HOSPADM

## 2018-02-06 RX ORDER — MAGNESIUM HYDROXIDE 1200 MG/15ML
LIQUID ORAL AS NEEDED
Status: DISCONTINUED | OUTPATIENT
Start: 2018-02-06 | End: 2018-02-06 | Stop reason: HOSPADM

## 2018-02-06 RX ORDER — SODIUM CHLORIDE 9 MG/ML
125 INJECTION, SOLUTION INTRAVENOUS CONTINUOUS
Status: DISCONTINUED | OUTPATIENT
Start: 2018-02-06 | End: 2018-02-06 | Stop reason: HOSPADM

## 2018-02-06 RX ORDER — ONDANSETRON 2 MG/ML
4 INJECTION INTRAMUSCULAR; INTRAVENOUS ONCE AS NEEDED
Status: DISCONTINUED | OUTPATIENT
Start: 2018-02-06 | End: 2018-02-06 | Stop reason: HOSPADM

## 2018-02-06 RX ORDER — FENTANYL CITRATE 50 UG/ML
INJECTION, SOLUTION INTRAMUSCULAR; INTRAVENOUS AS NEEDED
Status: DISCONTINUED | OUTPATIENT
Start: 2018-02-06 | End: 2018-02-06 | Stop reason: SURG

## 2018-02-06 RX ADMIN — SODIUM CHLORIDE 125 ML/HR: 0.9 INJECTION, SOLUTION INTRAVENOUS at 09:20

## 2018-02-06 RX ADMIN — FENTANYL CITRATE 25 MCG: 50 INJECTION INTRAMUSCULAR; INTRAVENOUS at 11:03

## 2018-02-06 RX ADMIN — PROPOFOL 75 MCG/KG/MIN: 10 INJECTION, EMULSION INTRAVENOUS at 11:06

## 2018-02-06 RX ADMIN — FENTANYL CITRATE 25 MCG: 50 INJECTION INTRAMUSCULAR; INTRAVENOUS at 12:01

## 2018-02-06 RX ADMIN — SODIUM CHLORIDE: 0.9 INJECTION, SOLUTION INTRAVENOUS at 12:05

## 2018-02-06 RX ADMIN — IMIPENEM AND CILASTATIN SODIUM 500 MG: 500; 500 INJECTION, POWDER, FOR SOLUTION INTRAVENOUS at 10:35

## 2018-02-06 RX ADMIN — FENTANYL CITRATE 25 MCG: 50 INJECTION INTRAMUSCULAR; INTRAVENOUS at 11:06

## 2018-02-06 NOTE — DISCHARGE SUMMARY
Discharge Summary - Lynne Reveles 66 y o  female MRN: [id number]    Admission Date: 2/6/2018     Admitting Diagnosis: Hydronephrosis with urinary obstruction due to renal calculus [N13 2]    Procedures Performed:  Cystoscopy, bilateral ureteral stent exchange  Patient underwent planned outpt surgery and recovered without complication  Discharged in good condition  Medications were prescribed  Pt knows to call the office for followup in six weeks

## 2018-02-06 NOTE — PERIOPERATIVE NURSING NOTE
Multiple wounds found on patients buttocks and right lower abdomen as described below  Right lower, lateral abdomen with 11 x 9 cm, and less than 1 cm deep  Oozing small amount clear drainage  Covered by abd gauze pad    Left buttock: 16x4 cm, less than 1cm deep  Small amount clear drainage  ABD pad lying on top    Left hip:  4x2cm, less than 1 cm deep, Small amount clear drainage    Right buttock:  26a29hi, 3x5 cm, and 10x12 cm  All with small amount clear drainage, all less than 1 cm deep
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
## 2018-02-06 NOTE — DISCHARGE INSTR - AVS FIRST PAGE
ALL YOUR  PREVIOUS MEDS ARE THE SAME  NEW MEDS:  None    EXPECT SOME BLOOD IN URINE, BURNING, FREQUENT URINATION  Blisters on the patient's backside were dressed with wound care cream and ABD pads  They will need further care by the [facility] wound care consultant
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
## 2018-02-06 NOTE — PERIOPERATIVE NURSING NOTE
Per pt's son, pt was started on an antibiotic that caused her to get boils  These boils created sores on her body  She was at [facility] for treatment of these wounds  Per Son these open areas are so much  Improved from before

## 2018-02-06 NOTE — ANESTHESIA PREPROCEDURE EVALUATION
Review of Systems/Medical History  Patient summary reviewed  Chart reviewed      Cardiovascular  Hyperlipidemia, Hypertension , Dysrhythmias, atrial fibrillation,    Pulmonary  Shortness of breath,        GI/Hepatic    GERD poorly controlled,        Kidney stones,        Endo/Other  Diabetes type 2 Oral agent, History of thyroid disease , hypothyroidism,      GYN  Negative gynecology ROS          Hematology   Musculoskeletal       Neurology    Neuromuscular disease , TIA, CVA , residual symptoms, Cerebral bleeding with side effects , intracranial hemorrhage,    Psychology   Anxiety, Depression ,              Physical Exam    Airway    Mallampati score: II  TM Distance: <3 FB  Neck ROM: full     Dental       Cardiovascular  Rhythm: irregular, Rate: abnormal,     Pulmonary  Breath sounds clear to auscultation, Decreased breath sounds,     Other Findings        Anesthesia Plan  ASA Score- 3     Anesthesia Type- IV sedation with anesthesia with ASA Monitors  Additional Monitors:   Airway Plan:         Plan Factors- Patient instructed to abstain from smoking on day of procedure  Patient did not smoke on day of surgery  Induction- intravenous  Postoperative Plan-     Informed Consent- Anesthetic plan and risks discussed with patient

## 2018-02-06 NOTE — OP NOTE
OPERATIVE REPORT  PATIENT NAME: Irineo Waddell    :  1940  MRN: [id number]  Pt Location: [facility] OR ROOM 08    SURGERY DATE: 2018    Surgeon(s) and Role:     Hien Norman MD - Primary    Preop Diagnosis:  Hydronephrosis with urinary obstruction due to renal calculus [N13 2]    Post-Op Diagnosis Codes: * Hydronephrosis with urinary obstruction due to renal calculus [N13 2]    Procedure(s) (LRB):  CYSTOSCOPY, LEFT STENT EXCHANGE, RIGHT STENT INSERTION (Bilateral)  Correction:  Cystoscopy, bilateral ureteral stent exchange and retrograde pyelograms     Specimen(s):  ID Type Source Tests Collected by Time Destination   A : urine culture  Urine Urine, Cystoscopic URINE CULTURE Mabelene Kocher, MD 2018 [id number]        Estimated Blood Loss:   Minimal    Drains:       Anesthesia Type:   General    Operative Indications:  Hydronephrosis with urinary obstruction due to renal calculus [N13 2]      Operative Findings:  Right side:  Recently stent placed recently by [facility] urology, minimally encrusted on the inside lumen of that stent, a new 8 2 Kazakh stent placed on that side  Left side recently replaced left stent, minimally encrusted on the inside, a new 8 2 Kazakh stent placed on that side  The old stent which is encrusted a large left staghorn stone remains in place  Complications:   None    Procedure and Technique:  After the patient was placed under adequate general anesthesia by propofol drip she was prepped and draped using Betadine solution in lithotomy position  She had extensive healing wounds from blisters on her low back that developed as a drug reaction last month  I discussed that with patient and her son preop and we were careful to drape the blisters off from any involvement with might irrigating fluid  The 25 Kazakh scope was passed in the urethra which was quite wide open and patulous  The stent on the right side was grasped, withdrawn, and wired    It was encrusted on the upper end of the stent lumen, but a very thin stiff wire went through that so that I could remove that right stent  A new 8 2 Urdu stent was passed on the right side, retrograde pyelogram showing good position, many stones in the right side  On the left side the stent that had been placed by [facility] u[facility] few weeks ago was grasped, brought out through the urethral meatus, and wired  Again it was encrusted at the upper end, but the stiff wire went through  A new 8 2 Urdu stent was passed on the left side, retrograde pyelogram showed good positioning, many stones on the left side and crusting the old stent  The scope was removed, Celaya catheter inserted, patient taken recovery in good condition  We placed wound care cream and ABD dressings all of the blisters on her backside and will report that to her caregivers at the nursing home     I was present for the entire procedure    Patient Disposition:  PACU     SIGNATURE: Mary Figueroa MD  DATE: February 6, 2018  TIME: 12:07 PM

## 2018-02-09 ENCOUNTER — TELEPHONE (OUTPATIENT)
Dept: UROLOGY | Facility: MEDICAL CENTER | Age: 78
End: 2018-02-09

## 2018-02-10 LAB
BACTERIA UR CULT: ABNORMAL

## 2018-02-13 ENCOUNTER — TELEPHONE (OUTPATIENT)
Dept: UROLOGY | Facility: AMBULATORY SURGERY CENTER | Age: 78
End: 2018-02-13

## 2023-10-17 NOTE — ED NOTES
Dariana Juarez RN unsuccessful with IV attempt      Selena Alvares RN  11/22/17 2051 Adbry Pregnancy And Lactation Text: It is unknown if this medication will adversely affect pregnancy or breast feeding.

## 2024-10-02 NOTE — PLAN OF CARE
PS sent to Dr Lee and Landy RN to update that precert is approved. Patient updated   Problem: Prexisting or High Potential for Compromised Skin Integrity  Goal: Skin integrity is maintained or improved  INTERVENTIONS:  - Identify patients at risk for skin breakdown  - Assess and monitor skin integrity  - Assess and monitor nutrition and hydration status  - Monitor labs (i e  albumin)  - Assess for incontinence   - Turn and reposition patient  - Assist with mobility/ambulation  - Relieve pressure over bony prominences  - Avoid friction and shearing  - Provide appropriate hygiene as needed including keeping skin clean and dry  - Evaluate need for skin moisturizer/barrier cream  - Collaborate with interdisciplinary team (i e  Nutrition, Rehabilitation, etc )   - Patient/family teaching   Outcome: Progressing      Problem: Potential for Falls  Goal: Patient will remain free of falls  INTERVENTIONS:  - Assess patient frequently for physical needs  -  Identify cognitive and physical deficits and behaviors that affect risk of falls    -  [place] fall precautions as indicated by assessment   - Educate patient/family on patient safety including physical limitations  - Instruct patient to call for assistance with activity based on assessment  - Modify environment to reduce risk of injury  - Consider OT/PT consult to assist with strengthening/mobility   Outcome: Progressing      Problem: METABOLIC, FLUID AND ELECTROLYTES - ADULT  Goal: Electrolytes maintained within normal limits  INTERVENTIONS:  - Monitor labs and assess patient for signs and symptoms of electrolyte imbalances  - Administer electrolyte replacement as ordered  - Monitor response to electrolyte replacements, including repeat lab results as appropriate  - Instruct patient on fluid and nutrition as appropriate   Outcome: Progressing    Goal: Fluid balance maintained  INTERVENTIONS:  - Monitor labs and assess for signs and symptoms of volume excess or deficit  - Monitor I/O and WT  - Instruct patient on fluid and nutrition as appropriate Outcome: Progressing      Problem: INFECTION - ADULT  Goal: Absence or prevention of progression during hospitalization  INTERVENTIONS:  - Assess and monitor for signs and symptoms of infection  - Monitor lab/diagnostic results  - Monitor all insertion sites, i e  indwelling lines, tubes, and drains  - Monitor endotracheal (as able) and nasal secretions for changes in amount and color  - [place]ie appropriate cooling/warming therapies per order  - Administer medications as ordered  - Instruct and encourage patient and family to use good hand hygiene technique  - Identify and instruct in appropriate isolation precautions for identified infection/condition   Outcome: Progressing    Goal: Absence of fever/infection during neutropenic period  INTERVENTIONS:  - Monitor WBC  - Implement neutropenic guidelines   Outcome: Progressing

## (undated) DEVICE — SHEATH URETERAL ACCESS 12/14FR 35CM PROXIS

## (undated) DEVICE — PACK TUR

## (undated) DEVICE — GLOVE INDICATOR PI UNDERGLOVE SZ 7 BLUE

## (undated) DEVICE — STANDARD SURGICAL GOWN, L: Brand: CONVERTORS

## (undated) DEVICE — INFLATION DEVICE 6FR X 4CM

## (undated) DEVICE — GUARDIAN LVC: Brand: GUARDIAN

## (undated) DEVICE — SCD SEQUENTIAL COMPRESSION COMFORT SLEEVE MEDIUM KNEE LENGTH: Brand: KENDALL SCD

## (undated) DEVICE — LUBRICANT SURGILUBE TUBE 4 OZ  FLIP TOP

## (undated) DEVICE — SURGICAL GOWN, XL SMARTSLEEVE: Brand: CONVERTORS

## (undated) DEVICE — GLOVE SRG BIOGEL 7.5

## (undated) DEVICE — GUIDEWIRE STRGHT TIP 0.035 IN  SOLO PLUS

## (undated) DEVICE — LASER HOLMIUM FIBER 600 MIC

## (undated) DEVICE — INVIEW CLEAR LEGGINGS: Brand: CONVERTORS

## (undated) DEVICE — GLOVE INDICATOR PI UNDERGLOVE SZ 6.5 BLUE

## (undated) DEVICE — TELFA NON-ADHERENT ABSORBENT DRESSING: Brand: TELFA

## (undated) DEVICE — KWART RETRO-INJECT URETERAL STENT SET
Type: IMPLANTABLE DEVICE | Site: URETER | Status: NON-FUNCTIONAL
Brand: KWART

## (undated) DEVICE — TUBING SUCTION 5MM X 12 FT

## (undated) DEVICE — UROCATCH BAG

## (undated) DEVICE — CATH FOLEY 20FR 5ML 2 WAY SILICONE ELASTIMER

## (undated) DEVICE — 3000CC GUARDIAN II: Brand: GUARDIAN

## (undated) DEVICE — GUIDEWIRE .038 X 145

## (undated) DEVICE — SPECIMEN CONTAINER STERILE PEEL PACK

## (undated) DEVICE — TRANSPOSAL ULTRAFLEX DUO/QUAD ULTRA CART MANIFOLD

## (undated) DEVICE — ABS MED DISTRACTION PIN, 14MM PATIENT (INNER): Brand: ABS MED DISTRACTION PIN

## (undated) DEVICE — GLOVE SRG BIOGEL 6.5

## (undated) DEVICE — ENDOSCOPIC VALVE WITH ADAPTER.: Brand: SURSEAL® II

## (undated) DEVICE — CATH FOLEY 22FR 5ML 2 WAY SILICONE ELASTIMER

## (undated) DEVICE — 3M™ STERI-STRIP™ COMPOUND BENZOIN TINCTURE 40 BAGS/CARTON 4 CARTONS/CASE C1544: Brand: 3M™ STERI-STRIP™

## (undated) DEVICE — LASER HOLMUIUM FIBER 1000 MIC

## (undated) DEVICE — BAG URINE DRAINAGE 2000ML ANTI RFLX LF